# Patient Record
Sex: FEMALE | Race: WHITE | Employment: UNEMPLOYED | URBAN - METROPOLITAN AREA
[De-identification: names, ages, dates, MRNs, and addresses within clinical notes are randomized per-mention and may not be internally consistent; named-entity substitution may affect disease eponyms.]

---

## 2019-07-17 ENCOUNTER — HOSPITAL ENCOUNTER (OUTPATIENT)
Age: 24
Discharge: HOME | End: 2019-07-17
Payer: MEDICARE

## 2019-07-17 PROCEDURE — 80307 DRUG TEST PRSMV CHEM ANLYZR: CPT

## 2019-07-18 ENCOUNTER — HOSPITAL ENCOUNTER (OUTPATIENT)
Age: 24
Discharge: HOME | End: 2019-07-18
Payer: MEDICARE

## 2019-07-18 LAB
ALBUMIN SERPL-MCNC: 4.5 GM/DL (ref 3.4–5)
ALP BLD-CCNC: 122 IU/L (ref 40–128)
ALT SERPL-CCNC: 14 U/L (ref 10–40)
ANION GAP SERPL CALCULATED.3IONS-SCNC: 11 MMOL/L (ref 4–16)
AST SERPL-CCNC: 15 IU/L (ref 15–37)
BASOPHILS ABSOLUTE: 0.1 K/CU MM
BASOPHILS RELATIVE PERCENT: 0.7 % (ref 0–1)
BILIRUB SERPL-MCNC: 0.4 MG/DL (ref 0–1)
BUN BLDV-MCNC: 13 MG/DL (ref 6–23)
CALCIUM SERPL-MCNC: 9.2 MG/DL (ref 8.3–10.6)
CHLORIDE BLD-SCNC: 101 MMOL/L (ref 99–110)
CHOLESTEROL: 148 MG/DL
CO2: 26 MMOL/L (ref 21–32)
CREAT SERPL-MCNC: 0.7 MG/DL (ref 0.6–1.1)
DIFFERENTIAL TYPE: ABNORMAL
EOSINOPHILS ABSOLUTE: 0.2 K/CU MM
EOSINOPHILS RELATIVE PERCENT: 1.8 % (ref 0–3)
GFR AFRICAN AMERICAN: >60 ML/MIN/1.73M2
GFR NON-AFRICAN AMERICAN: >60 ML/MIN/1.73M2
GLUCOSE BLD-MCNC: 85 MG/DL (ref 70–99)
GONADOTROPIN, CHORIONIC (HCG) QUANT: NORMAL UIU/ML
HCT VFR BLD CALC: 42.6 % (ref 37–47)
HDLC SERPL-MCNC: 62 MG/DL
HEMOGLOBIN: 13.7 GM/DL (ref 12.5–16)
IMMATURE NEUTROPHIL %: 0.5 % (ref 0–0.43)
LDL CHOLESTEROL DIRECT: 95 MG/DL
LYMPHOCYTES ABSOLUTE: 2.8 K/CU MM
LYMPHOCYTES RELATIVE PERCENT: 25.8 % (ref 24–44)
MCH RBC QN AUTO: 28.4 PG (ref 27–31)
MCHC RBC AUTO-ENTMCNC: 32.2 % (ref 32–36)
MCV RBC AUTO: 88.4 FL (ref 78–100)
MONOCYTES ABSOLUTE: 1.2 K/CU MM
MONOCYTES RELATIVE PERCENT: 11.4 % (ref 0–4)
NUCLEATED RBC %: 0 %
PDW BLD-RTO: 13.2 % (ref 11.7–14.9)
PLATELET # BLD: 332 K/CU MM (ref 140–440)
PMV BLD AUTO: 10.5 FL (ref 7.5–11.1)
POTASSIUM SERPL-SCNC: 4.2 MMOL/L (ref 3.5–5.1)
RBC # BLD: 4.82 M/CU MM (ref 4.2–5.4)
SEGMENTED NEUTROPHILS ABSOLUTE COUNT: 6.5 K/CU MM
SEGMENTED NEUTROPHILS RELATIVE PERCENT: 59.8 % (ref 36–66)
SODIUM BLD-SCNC: 138 MMOL/L (ref 135–145)
TOTAL IMMATURE NEUTOROPHIL: 0.05 K/CU MM
TOTAL NUCLEATED RBC: 0 K/CU MM
TOTAL PROTEIN: 6.7 GM/DL (ref 6.4–8.2)
TRIGL SERPL-MCNC: 39 MG/DL
WBC # BLD: 10.8 K/CU MM (ref 4–10.5)

## 2019-07-18 PROCEDURE — 83721 ASSAY OF BLOOD LIPOPROTEIN: CPT

## 2019-07-18 PROCEDURE — 84702 CHORIONIC GONADOTROPIN TEST: CPT

## 2019-07-18 PROCEDURE — 80053 COMPREHEN METABOLIC PANEL: CPT

## 2019-07-18 PROCEDURE — 36415 COLL VENOUS BLD VENIPUNCTURE: CPT

## 2019-07-18 PROCEDURE — 85025 COMPLETE CBC W/AUTO DIFF WBC: CPT

## 2019-07-18 PROCEDURE — 80061 LIPID PANEL: CPT

## 2019-07-19 LAB
AMPHETAMINES: NEGATIVE
BARBITURATE SCREEN URINE: NEGATIVE
BENZODIAZEPINE SCREEN, URINE: NEGATIVE
CANNABINOID SCREEN URINE: NEGATIVE
COCAINE METABOLITE: NEGATIVE
OPIATES, URINE: NEGATIVE
OXYCODONE: NORMAL
PHENCYCLIDINE, URINE: NEGATIVE

## 2019-07-21 ENCOUNTER — HOSPITAL ENCOUNTER (OUTPATIENT)
Age: 24
Setting detail: SPECIMEN
Discharge: HOME | End: 2019-07-21

## 2019-07-21 PROCEDURE — 81001 URINALYSIS AUTO W/SCOPE: CPT

## 2019-07-22 LAB
BACTERIA: ABNORMAL /HPF
BILIRUBIN URINE: NEGATIVE MG/DL
BLOOD, URINE: NEGATIVE
CLARITY: CLEAR
COLOR: YELLOW
GLUCOSE, URINE: NEGATIVE MG/DL
KETONES, URINE: NEGATIVE MG/DL
LEUKOCYTE ESTERASE, URINE: NEGATIVE
NITRITE URINE, QUANTITATIVE: NEGATIVE
PH, URINE: 8 (ref 5–8)
PROTEIN UA: NEGATIVE MG/DL
RBC URINE: <1 /HPF (ref 0–6)
SPECIFIC GRAVITY UA: 1.02 (ref 1–1.03)
SQUAMOUS EPITHELIAL: 1 /HPF
TRICHOMONAS: ABNORMAL /HPF
UROBILINOGEN, URINE: NORMAL MG/DL (ref 0.2–1)
WBC UA: <1 /HPF (ref 0–5)

## 2021-11-04 ENCOUNTER — OFFICE VISIT (OUTPATIENT)
Dept: OBGYN CLINIC | Age: 26
End: 2021-11-04
Payer: COMMERCIAL

## 2021-11-04 VITALS
HEIGHT: 66 IN | DIASTOLIC BLOOD PRESSURE: 79 MMHG | BODY MASS INDEX: 42.3 KG/M2 | WEIGHT: 263.2 LBS | SYSTOLIC BLOOD PRESSURE: 114 MMHG

## 2021-11-04 DIAGNOSIS — N39.0 RECURRENT UTI: ICD-10-CM

## 2021-11-04 DIAGNOSIS — N94.6 DYSMENORRHEA: Primary | ICD-10-CM

## 2021-11-04 PROCEDURE — G8427 DOCREV CUR MEDS BY ELIG CLIN: HCPCS | Performed by: NURSE PRACTITIONER

## 2021-11-04 PROCEDURE — 1036F TOBACCO NON-USER: CPT | Performed by: NURSE PRACTITIONER

## 2021-11-04 PROCEDURE — 99203 OFFICE O/P NEW LOW 30 MIN: CPT | Performed by: NURSE PRACTITIONER

## 2021-11-04 PROCEDURE — G8417 CALC BMI ABV UP PARAM F/U: HCPCS | Performed by: NURSE PRACTITIONER

## 2021-11-04 PROCEDURE — G8484 FLU IMMUNIZE NO ADMIN: HCPCS | Performed by: NURSE PRACTITIONER

## 2021-11-04 RX ORDER — VENLAFAXINE HYDROCHLORIDE 75 MG/1
CAPSULE, EXTENDED RELEASE ORAL
COMMUNITY
Start: 2021-10-05 | End: 2021-12-08 | Stop reason: ALTCHOICE

## 2021-11-04 RX ORDER — METHOCARBAMOL 750 MG/1
TABLET ORAL
COMMUNITY
Start: 2021-10-26

## 2021-11-04 RX ORDER — ZOLPIDEM TARTRATE 5 MG/1
TABLET ORAL
COMMUNITY
Start: 2021-08-11

## 2021-11-04 RX ORDER — CARIPRAZINE 1.5 MG/1
CAPSULE, GELATIN COATED ORAL
COMMUNITY
Start: 2021-11-02

## 2021-11-04 RX ORDER — HYDROXYZINE PAMOATE 50 MG/1
CAPSULE ORAL
COMMUNITY
Start: 2021-10-05 | End: 2021-11-04 | Stop reason: SINTOL

## 2021-11-04 RX ORDER — TOPIRAMATE 50 MG/1
TABLET, FILM COATED ORAL
COMMUNITY
Start: 2021-10-05

## 2021-11-04 RX ORDER — MULTIVIT-MIN/IRON FUM/FOLIC AC 7.5 MG-4
TABLET ORAL
COMMUNITY
Start: 2021-10-26

## 2021-11-04 RX ORDER — ARIPIPRAZOLE LAUROXIL 882 MG/3.2ML
INJECTION, SUSPENSION, EXTENDED RELEASE INTRAMUSCULAR
COMMUNITY
Start: 2021-11-02 | End: 2021-12-08 | Stop reason: DRUGHIGH

## 2021-11-04 RX ORDER — ARIPIPRAZOLE 5 MG/1
TABLET ORAL
COMMUNITY
Start: 2021-08-11

## 2021-11-04 RX ORDER — NITROFURANTOIN 25; 75 MG/1; MG/1
100 CAPSULE ORAL 2 TIMES DAILY
COMMUNITY
End: 2021-12-08 | Stop reason: DRUGHIGH

## 2021-11-04 ASSESSMENT — ENCOUNTER SYMPTOMS
RESPIRATORY NEGATIVE: 1
GASTROINTESTINAL NEGATIVE: 1

## 2021-11-04 NOTE — PROGRESS NOTES
PROBLEM VISIT     Date of service: 2021    Claudia Harrell  Is a 26 y.o. female    PT's PCP is: No primary care provider on file. : 1995                                             Subjective:       No LMP recorded. Patient has had an implant. OB History    Para Term  AB Living   0 0 0 0 0 0   SAB TAB Ectopic Molar Multiple Live Births   0 0 0 0 0 0        Social History     Tobacco Use   Smoking Status Former Smoker    Types: Cigarettes   Smokeless Tobacco Never Used        Social History     Substance and Sexual Activity   Alcohol Use Yes    Comment: socially       Allergies: Buspirone, Hydroxyzine hcl, Influenza vaccines, and Lamotrigine      Current Outpatient Medications:     ascorbic acid (VITAMIN C) 1000 MG tablet, Take 1,000 mg by mouth 2 times daily, Disp: , Rfl:     nitrofurantoin, macrocrystal-monohydrate, (MACROBID) 100 MG capsule, Take 100 mg by mouth 2 times daily, Disp: , Rfl:     zolpidem (AMBIEN) 5 MG tablet, , Disp: , Rfl:     venlafaxine (EFFEXOR XR) 75 MG extended release capsule, , Disp: , Rfl:     topiramate (TOPAMAX) 50 MG tablet, , Disp: , Rfl:     ARIPiprazole (ABILIFY) 5 MG tablet, , Disp: , Rfl:     ARISTADA 882 MG/3.2ML PRSY injection, , Disp: , Rfl:     D3-50 1.25 MG (65294 UT) CAPS, , Disp: , Rfl:     etonogestrel (NEXPLANON) 68 MG implant, Inject into the skin, Disp: , Rfl:     Multiple Vitamins-Minerals (MULTIVITAMIN WITH MINERALS) tablet, , Disp: , Rfl:     VRAYLAR 1.5 MG capsule, , Disp: , Rfl:     Social History     Substance and Sexual Activity   Sexual Activity Not Currently     Chief Complaint   Patient presents with    Other     Discuss removing and replacing Nexplanon. Reports it  last month. C/O recurrent UTI's- currently being treated with Macrobid. PE:  Vital Signs  Blood pressure 114/79, height 5' 6\" (1.676 m), weight 263 lb 3.2 oz (119.4 kg).      HPI: Patient presents today to discuss Nexplanon removal and replacement. States this current  Nexplanon is 1 month . She has had two prior Nexplanon devices with good results each time. States cycles are well controlled. Complains of reoccurring UTI's. States infection typically follows use of sex toys. Currently being treatment with Macrobid     PT denies fever, chills, nausea and vomiting       Review of Systems   Constitutional: Negative. Respiratory: Negative. Cardiovascular: Negative. Gastrointestinal: Negative. Genitourinary: Positive for menstrual problem (dysmenorrhea). Negative for dysuria, frequency, pelvic pain, vaginal bleeding and vaginal discharge. Neurological: Negative. Physical Exam  Constitutional:       Appearance: Normal appearance. HENT:      Head: Normocephalic. Pulmonary:      Effort: Pulmonary effort is normal.   Musculoskeletal:         General: Normal range of motion. Neurological:      General: No focal deficit present. Mental Status: She is alert. Psychiatric:         Mood and Affect: Mood normal.         Behavior: Behavior normal.       Assessment and Plan          Diagnosis Orders   1. Dysmenorrhea  hCG, Quantitative, Pregnancy   2. Recurrent UTI       Encouraged proper cleaning of sex toys and genital hygiene. Reviewed removal and replacement procedure, risks/benefits, common bleeding patterns and side effects. Patient aware she will need hcg the day prior to insertion. Will pull Nexplanon from stock. I have discontinued Nayla Lau's hydrOXYzine. I am also having her maintain her zolpidem, venlafaxine, topiramate, ARIPiprazole, Aristada, ascorbic acid, D3-50, etonogestrel, multivitamin with minerals, Vraylar, and nitrofurantoin (macrocrystal-monohydrate). Return for Nexplanon removal and reinsertion . There are no Patient Instructions on file for this visit.     Over 50% of time spent on counseling and care coordination on: see assessment and plan,  She was also counseled on her

## 2021-11-15 ENCOUNTER — TELEPHONE (OUTPATIENT)
Dept: OBGYN CLINIC | Age: 26
End: 2021-11-15

## 2021-11-15 NOTE — TELEPHONE ENCOUNTER
Patient called stating that she was at [facility] and they changed their hours today and do not open until 9am.  She was there first thing and needs to leave town right after getting labs and cannot wait until 9am.  She is going to [facility] to get labs drawn for Nexplanon placement tomorrow. Order faxed to [phone].

## 2021-11-16 ENCOUNTER — PROCEDURE VISIT (OUTPATIENT)
Dept: OBGYN CLINIC | Age: 26
End: 2021-11-16
Payer: COMMERCIAL

## 2021-11-16 VITALS — WEIGHT: 260.6 LBS | BODY MASS INDEX: 42.06 KG/M2

## 2021-11-16 DIAGNOSIS — Z30.46 ENCOUNTER FOR REMOVAL AND REINSERTION OF NEXPLANON: Primary | ICD-10-CM

## 2021-11-16 DIAGNOSIS — N94.6 DYSMENORRHEA: ICD-10-CM

## 2021-11-16 PROCEDURE — 11983 REMOVE/INSERT DRUG IMPLANT: CPT | Performed by: NURSE PRACTITIONER

## 2021-11-16 NOTE — PROGRESS NOTES
23 y.o.  11/16/2021      Jaylin Ibarra is a 23 y.o. female is requesting to have her Nexplanon removed and replaced. She does not have any other problems today. Serum hcg was negative. Past Medical History:   Diagnosis Date    ADHD     Anxiety     Bipolar 1 disorder (HCC)     Chiari I malformation ([facility] [address]     Depression     PTSD (post-traumatic stress disorder)     Schizo affective schizophrenia ([facility]          Past Surgical History:   Procedure Laterality Date    BREAST REDUCTION SURGERY      OTHER SURGICAL HISTORY      chirari malformation decompression    WISDOM TOOTH EXTRACTION         Family History   Problem Relation Age of Onset    Heart Attack Paternal Grandfather     Stroke Paternal Grandfather     Diabetes Maternal Aunt        Social History     Tobacco Use    Smoking status: Former Smoker     Types: Cigarettes    Smokeless tobacco: Never Used   Vaping Use    Vaping Use: Some days    Substances: Nicotine, Flavoring   Substance Use Topics    Alcohol use: Yes     Comment: socially    Drug use: Never       Current Outpatient Medications on File Prior to Visit   Medication Sig Dispense Refill    zolpidem (AMBIEN) 5 MG tablet       venlafaxine (EFFEXOR XR) 75 MG extended release capsule       topiramate (TOPAMAX) 50 MG tablet       ARIPiprazole (ABILIFY) 5 MG tablet       ARISTADA 882 MG/3.2ML PRSY injection       ascorbic acid (VITAMIN C) 1000 MG tablet Take 1,000 mg by mouth 2 times daily      D3-50 1.25 MG (86094 UT) CAPS       etonogestrel (NEXPLANON) 68 MG implant Inject into the skin      Multiple Vitamins-Minerals (MULTIVITAMIN WITH MINERALS) tablet       VRAYLAR 1.5 MG capsule       nitrofurantoin, macrocrystal-monohydrate, (MACROBID) 100 MG capsule Take 100 mg by mouth 2 times daily       No current facility-administered medications on file prior to visit.        Allergies as of 11/16/2021 - Fully Reviewed 11/16/2021   Allergen Reaction Noted    Buspirone Anaphylaxis and Shortness Of Breath 2021    Hydroxyzine hcl Shortness Of Breath 2020    Influenza vaccines  2021    Lamotrigine Anaphylaxis and Rash 2021         OB History    Para Term  AB Living   0 0 0 0 0 0   SAB IAB Ectopic Molar Multiple Live Births   0 0 0 0 0 0         Weight 260 lb 9.6 oz (118.2 kg). PROCEDURE:  The patient was positioned comfortably on our procedure table. She was consented earlier in the appointment and the procedure risk and complications were reviewed. Procedure being completed on left upper arm. A sterile prep with betadine x3 swabs was completed and 1ml of lidocaine with epinephrine for local anesthetic was utilized. The skin had a small incision just beyond the proximal tip of the insert and utilizing blunt dissection the stylet was grasped and removed. Device was intact and visualized by patient and I.  A sterile dressing and steri-strip was applied with a pressure wrap. The patient tolerated the procedure well. Formal restrictions were discussed in detail. She is to notify our office if any swelling, redness, temperature, or limb restriction or numbness. No baths, Pools or Lakes until Follow up. Showers are allowed in 24 hours. She may take Tylenol for any pain. PROCEDURE:  The patient was positioned comfortably on our procedure table. She was consented earlier in the appointment and the procedure risk and complications were reviewed. She was marked. A sterile prep was completed with betadine x3 swabs and a 1% lidocaine with epinephrine for local anesthetic was utilized, 1 ml. The nexplanon carlos was noted within the applicator. The Nexplanon was inserted per protocol in the left upper arm with insertion site subdermally at the inner side of the non-dominant upper arm, overlying the triceps muscle about 8-10cm from the medial epicondyle of the humerus and 3-5cm posterior to the sulcus groove between the biceps and triceps muscle.   Placement was confirmed by palpating the device by me and the patient. A steri strip was applied. A pressure wrap was applied. The patient tolerated the procedure well. Formal restrictions were discussed in detail. She is to notify our office if any swelling, redness, temperature, or limb restriction or numbness. No baths, Pools or Lakes until Follow up. She may take Tylenol for any pain. Nexplanon [address] [id number]  Nexplanon Lot # [id number]  Nexplanon Expires 11/21/2023  Nexplanon  [facility]    Assessment and Plan          Diagnosis Orders   1. Encounter for removal and reinsertion of Nexplanon     2. Dysmenorrhea               I am having Bryce Asencio Sid maintain her zolpidem, venlafaxine, topiramate, ARIPiprazole, Aristada, ascorbic acid, D3-50, etonogestrel, multivitamin with minerals, Vraylar, and nitrofurantoin (macrocrystal-monohydrate). Return in about 1 year (around 11/16/2022) for yearly. There are no Patient Instructions on file for this visit.     GURINDER Aaron NP,11/16/2021 2:40 PM

## 2021-11-22 DIAGNOSIS — N94.6 DYSMENORRHEA: ICD-10-CM

## 2021-12-08 ENCOUNTER — OFFICE VISIT (OUTPATIENT)
Dept: OBGYN CLINIC | Age: 26
End: 2021-12-08
Payer: COMMERCIAL

## 2021-12-08 ENCOUNTER — HOSPITAL ENCOUNTER (OUTPATIENT)
Age: 26
Setting detail: SPECIMEN
Discharge: HOME | End: 2021-12-08

## 2021-12-08 VITALS — DIASTOLIC BLOOD PRESSURE: 79 MMHG | WEIGHT: 262.6 LBS | BODY MASS INDEX: 42.38 KG/M2 | SYSTOLIC BLOOD PRESSURE: 114 MMHG

## 2021-12-08 DIAGNOSIS — N76.0 ACUTE VAGINITIS: ICD-10-CM

## 2021-12-08 DIAGNOSIS — Z01.419 WOMEN'S ANNUAL ROUTINE GYNECOLOGICAL EXAMINATION: Primary | ICD-10-CM

## 2021-12-08 LAB
DIRECT EXAM: ABNORMAL
Lab: ABNORMAL
SPECIMEN DESCRIPTION: ABNORMAL

## 2021-12-08 PROCEDURE — 99395 PREV VISIT EST AGE 18-39: CPT | Performed by: NURSE PRACTITIONER

## 2021-12-08 PROCEDURE — G8484 FLU IMMUNIZE NO ADMIN: HCPCS | Performed by: NURSE PRACTITIONER

## 2021-12-08 RX ORDER — LORAZEPAM 1 MG/1
TABLET ORAL
COMMUNITY
Start: 2021-12-07

## 2021-12-08 RX ORDER — DESVENLAFAXINE 50 MG/1
TABLET, EXTENDED RELEASE ORAL
COMMUNITY
Start: 2021-12-07

## 2021-12-08 RX ORDER — MIRTAZAPINE 15 MG/1
TABLET, FILM COATED ORAL
COMMUNITY
Start: 2021-11-23

## 2021-12-08 RX ORDER — ARIPIPRAZOLE LAUROXIL 1064 MG/3.9ML
INJECTION, SUSPENSION, EXTENDED RELEASE INTRAMUSCULAR
COMMUNITY
Start: 2021-12-07

## 2021-12-08 ASSESSMENT — ENCOUNTER SYMPTOMS
SHORTNESS OF BREATH: 0
ABDOMINAL PAIN: 0
CONSTIPATION: 0
DIARRHEA: 0

## 2021-12-08 NOTE — PROGRESS NOTES
YEARLY PHYSICAL    Date of service: 2021    Nataliia Maxwell  Is a 23 y.o.  female    PT's PCP is: No primary care provider on file. : 1995                                         Chaperone for Intimate Exam   Chaperone was offered as part of the rooming process. Patient declined and agrees to continue with exam without a chaperone.  Chaperone: n/a      Subjective:       No LMP recorded. Patient has had an implant.      Are your menses regular: no menses with Nexplanon     OB History    Para Term  AB Living   0 0 0 0 0 0   SAB IAB Ectopic Molar Multiple Live Births   0 0 0 0 0 0        Social History     Tobacco Use   Smoking Status Former Smoker    Types: Cigarettes   Smokeless Tobacco Never Used        Social History     Substance and Sexual Activity   Alcohol Use Yes    Comment: socially       Family History   Problem Relation Age of Onset    Heart Attack Paternal Grandfather     Stroke Paternal Grandfather     Diabetes Maternal Aunt        Any family history of breast or ovarian cancer: No    Any family history of blood clots: No      Allergies: Buspirone, Hydroxyzine hcl, Influenza vaccines, and Lamotrigine      Current Outpatient Medications:     ARISTADA 1064 MG/3.9ML PRSY injection, , Disp: , Rfl:     mirtazapine (REMERON) 15 MG tablet, , Disp: , Rfl:     LORazepam (ATIVAN) 1 MG tablet, , Disp: , Rfl:     desvenlafaxine succinate (PRISTIQ) 50 MG TB24 extended release tablet, , Disp: , Rfl:     zolpidem (AMBIEN) 5 MG tablet, , Disp: , Rfl:     topiramate (TOPAMAX) 50 MG tablet, , Disp: , Rfl:     ARIPiprazole (ABILIFY) 5 MG tablet, , Disp: , Rfl:     ascorbic acid (VITAMIN C) 1000 MG tablet, Take 1,000 mg by mouth 2 times daily, Disp: , Rfl:     D3-50 1.25 MG (08689 UT) CAPS, , Disp: , Rfl:     etonogestrel (NEXPLANON) 68 MG implant, Inject into the skin, Disp: , Rfl:     Multiple Vitamins-Minerals (MULTIVITAMIN WITH MINERALS) tablet, , Disp: , Rfl:     VRAYLAR 1.5 MG capsule, , Disp: , Rfl:     Social History     Substance and Sexual Activity   Sexual Activity Yes    Partners: Male       Any bleeding or pain with intercourse: Not typically but once last week    Last Yearly:  ? Last pap: ? Last HPV: n/a    Do you do self breast exams: encouraged monthly SBE    Past Medical History:   Diagnosis Date    ADHD     Anxiety     Bipolar 1 disorder (HCC)     Chiari I malformation (HCC)     Depression     PTSD (post-traumatic stress disorder)     Schizo affective schizophrenia ([facility] [address]        Past Surgical History:   Procedure Laterality Date    BREAST REDUCTION SURGERY      OTHER SURGICAL HISTORY      chirari malformation decompression    WISDOM TOOTH EXTRACTION         Family History   Problem Relation Age of Onset    Heart Attack Paternal Grandfather     Stroke Paternal Grandfather     Diabetes Maternal Aunt        Chief Complaint   Patient presents with    Gynecologic Exam     Pap due.  Vaginal Itching     C/O continued itching and irritation. PE:  Vital Signs  Blood pressure 114/79, weight 262 lb 9.6 oz (119.1 kg). Estimated body mass index is 42.38 kg/m² as calculated from the following:    Height as of 11/4/21: 5' 6\" (1.676 m). Weight as of this encounter: 262 lb 9.6 oz (119.1 kg). HPI: Patient presents today for annual exam. Feeling well, voices no concerns. Denies breast/pelvic pain. Pap due. No menses with Nexplanon. Reports external vaginal itching and irritation. Denies vaginal discharge. Review of Systems   Constitutional: Negative for chills, fatigue and fever. Respiratory: Negative for shortness of breath. Cardiovascular: Negative for chest pain. Gastrointestinal: Negative for abdominal pain, constipation and diarrhea. Genitourinary: Positive for vaginal pain.  Negative for dysuria, enuresis, frequency, menstrual problem, pelvic pain, urgency, vaginal bleeding and vaginal discharge. Neurological: Negative for dizziness, light-headedness and headaches. Physical Exam  Constitutional:       General: She is not in acute distress. Appearance: Normal appearance. She is not ill-appearing. Genitourinary:      Vulva normal.      Vaginal discharge (copious amt of bright yellow discharge) and tenderness present. No vaginal bleeding. Right Adnexa: not tender. Left Adnexa: not tender. Cervical friability present. Uterus is not tender. Breasts:      Right: No mass, nipple discharge, skin change or tenderness. Left: No mass, nipple discharge, skin change or tenderness. HENT:      Head: Normocephalic. Cardiovascular:      Rate and Rhythm: Normal rate and regular rhythm. Pulmonary:      Effort: Pulmonary effort is normal.      Breath sounds: Normal breath sounds. Abdominal:      Palpations: Abdomen is soft. Tenderness: There is no abdominal tenderness. There is no guarding or rebound. Musculoskeletal:         General: Normal range of motion. Neurological:      General: No focal deficit present. Mental Status: She is alert. Psychiatric:         Mood and Affect: Mood normal.         Behavior: Behavior normal.                             Assessment and Plan          Diagnosis Orders   1. Women's annual routine gynecological examination  PAP SMEAR   2. Acute vaginitis  Chlamydia/GC DNA, Thin Prep    Vaginitis DNA Probe       Repeat Annual every 1 year  Cervical Cytology Evaluation begins at 24years old. If Negative Cytology, Follow-up screening per current guidelines. Mammograms every 1year. If 37 yo and last mammogram was negative. Routine healthmaintenance per patients PCP. reviewed copious vaginal discharge and recommendation for vaginal cultures. Patient agreeable. I have discontinued Asael Chance. Sid's venlafaxine.  I am also having her maintain her zolpidem, topiramate, ARIPiprazole, ascorbic acid, D3-50, etonogestrel, multivitamin with minerals, Vraylar, Aristada, mirtazapine, LORazepam, and desvenlafaxine succinate. Return in about 1 year (around 12/8/2022) for yearly. She was also counseled on her preventative health maintenance recommendations and follow-up. There are no Patient Instructions on file for this visit.     GURINDER Vines NP,12/8/2021 4:00 PM

## 2021-12-09 LAB
C TRACH DNA GENITAL QL NAA+PROBE: NEGATIVE
N. GONORRHOEAE DNA: NEGATIVE
SPECIMEN DESCRIPTION: NORMAL

## 2021-12-13 RX ORDER — METRONIDAZOLE 500 MG/1
500 TABLET ORAL 2 TIMES DAILY
Qty: 14 TABLET | Refills: 0 | Status: SHIPPED | OUTPATIENT
Start: 2021-12-13 | End: 2021-12-20

## 2021-12-16 LAB — CYTOLOGY REPORT: NORMAL

## 2024-03-04 ENCOUNTER — HOSPITAL ENCOUNTER (OUTPATIENT)
Age: 29
Setting detail: SPECIMEN
Discharge: HOME | End: 2024-03-04
Payer: COMMERCIAL

## 2024-03-04 LAB
CHOLEST SERPL-MCNC: 135 MG/DL
HDLC SERPL-MCNC: 39 MG/DL
LDLC SERPL CALC-MCNC: 82 MG/DL
TRIGL SERPL-MCNC: 71 MG/DL
TSH SERPL DL<=0.005 MIU/L-ACNC: 1.84 UIU/ML (ref 0.27–4.2)

## 2024-03-04 PROCEDURE — 36415 COLL VENOUS BLD VENIPUNCTURE: CPT

## 2024-03-04 PROCEDURE — 84443 ASSAY THYROID STIM HORMONE: CPT

## 2024-03-04 PROCEDURE — 80061 LIPID PANEL: CPT

## 2024-03-05 ENCOUNTER — CLINICAL DOCUMENTATION (OUTPATIENT)
Dept: INTERNAL MEDICINE CLINIC | Age: 29
End: 2024-03-05

## 2024-06-02 ENCOUNTER — APPOINTMENT (OUTPATIENT)
Dept: CT IMAGING | Age: 29
End: 2024-06-02
Payer: COMMERCIAL

## 2024-06-02 ENCOUNTER — HOSPITAL ENCOUNTER (EMERGENCY)
Age: 29
Discharge: HOME | End: 2024-06-02
Attending: EMERGENCY MEDICINE
Payer: COMMERCIAL

## 2024-06-02 VITALS
RESPIRATION RATE: 16 BRPM | WEIGHT: 270 LBS | OXYGEN SATURATION: 100 % | DIASTOLIC BLOOD PRESSURE: 74 MMHG | BODY MASS INDEX: 43.39 KG/M2 | HEIGHT: 66 IN | HEART RATE: 74 BPM | TEMPERATURE: 98.2 F | SYSTOLIC BLOOD PRESSURE: 110 MMHG

## 2024-06-02 DIAGNOSIS — S31.000D SACRAL WOUND, SUBSEQUENT ENCOUNTER: Primary | ICD-10-CM

## 2024-06-02 LAB
ANION GAP SERPL CALCULATED.3IONS-SCNC: 10 MMOL/L (ref 7–16)
BASOPHILS ABSOLUTE: 0.1 K/CU MM
BASOPHILS RELATIVE PERCENT: 0.6 % (ref 0–1)
BUN SERPL-MCNC: 10 MG/DL (ref 6–23)
CALCIUM SERPL-MCNC: 8.8 MG/DL (ref 8.3–10.6)
CHLORIDE BLD-SCNC: 103 MMOL/L (ref 99–110)
CO2: 25 MMOL/L (ref 21–32)
CREAT SERPL-MCNC: 0.6 MG/DL (ref 0.6–1.1)
DIFFERENTIAL TYPE: ABNORMAL
EOSINOPHILS ABSOLUTE: 0.3 K/CU MM
EOSINOPHILS RELATIVE PERCENT: 2.8 % (ref 0–3)
GFR, ESTIMATED: >90 ML/MIN/1.73M2
GLUCOSE SERPL-MCNC: 94 MG/DL (ref 70–99)
HCT VFR BLD CALC: 40.5 % (ref 37–47)
HEMOGLOBIN: 12.6 GM/DL (ref 12.5–16)
IMMATURE NEUTROPHIL %: 0.3 % (ref 0–0.43)
LYMPHOCYTES ABSOLUTE: 2 K/CU MM
LYMPHOCYTES RELATIVE PERCENT: 19.7 % (ref 24–44)
MCH RBC QN AUTO: 25.5 PG (ref 27–31)
MCHC RBC AUTO-ENTMCNC: 31.1 % (ref 32–36)
MCV RBC AUTO: 81.8 FL (ref 78–100)
MONOCYTES ABSOLUTE: 0.9 K/CU MM
MONOCYTES RELATIVE PERCENT: 8.6 % (ref 0–4)
NEUTROPHILS ABSOLUTE: 6.9 K/CU MM
NEUTROPHILS RELATIVE PERCENT: 68 % (ref 36–66)
NUCLEATED RBC %: 0 %
PDW BLD-RTO: 14.4 % (ref 11.7–14.9)
PLATELET # BLD: 381 K/CU MM (ref 140–440)
PMV BLD AUTO: 9.8 FL (ref 7.5–11.1)
POTASSIUM SERPL-SCNC: 4.2 MMOL/L (ref 3.5–5.1)
RBC # BLD: 4.95 M/CU MM (ref 4.2–5.4)
SODIUM BLD-SCNC: 138 MMOL/L (ref 135–145)
TOTAL IMMATURE NEUTOROPHIL: 0.03 K/CU MM
TOTAL NUCLEATED RBC: 0 K/CU MM
WBC # BLD: 10.2 K/CU MM (ref 4–10.5)

## 2024-06-02 PROCEDURE — 85025 COMPLETE CBC W/AUTO DIFF WBC: CPT

## 2024-06-02 PROCEDURE — 99284 EMERGENCY DEPT VISIT MOD MDM: CPT

## 2024-06-02 PROCEDURE — 72192 CT PELVIS W/O DYE: CPT

## 2024-06-02 PROCEDURE — 80048 BASIC METABOLIC PNL TOTAL CA: CPT

## 2024-06-02 ASSESSMENT — PAIN - FUNCTIONAL ASSESSMENT: PAIN_FUNCTIONAL_ASSESSMENT: ACTIVITIES ARE NOT PREVENTED

## 2024-06-02 ASSESSMENT — PAIN DESCRIPTION - PAIN TYPE: TYPE: CHRONIC PAIN

## 2024-06-02 ASSESSMENT — PAIN SCALES - GENERAL
PAINLEVEL_OUTOF10: 7
PAINLEVEL_OUTOF10: 7

## 2024-06-02 ASSESSMENT — PAIN DESCRIPTION - ORIENTATION: ORIENTATION: MID

## 2024-06-02 ASSESSMENT — PAIN DESCRIPTION - ONSET: ONSET: ON-GOING

## 2024-06-02 ASSESSMENT — PAIN DESCRIPTION - DESCRIPTORS
DESCRIPTORS: SHARP
DESCRIPTORS: ACHING

## 2024-06-02 ASSESSMENT — PAIN DESCRIPTION - LOCATION
LOCATION: COCCYX
LOCATION: SACRUM

## 2024-06-02 ASSESSMENT — LIFESTYLE VARIABLES
HOW OFTEN DO YOU HAVE A DRINK CONTAINING ALCOHOL: NEVER
HOW MANY STANDARD DRINKS CONTAINING ALCOHOL DO YOU HAVE ON A TYPICAL DAY: PATIENT DOES NOT DRINK

## 2024-06-02 NOTE — ED NOTES
Wound care complete at this time per this nurse. Patient sent home with extra wound supplies until she is able to get into the wound clinic. Patient discharged to home at this time. Discharge instructions and follow up care discussed, patient voices understanding.

## 2024-06-02 NOTE — ED NOTES
To and from restroom without difficulty. Pt informed that she should get a urine sample. Pt voiced understanding

## 2024-06-02 NOTE — DISCHARGE INSTRUCTIONS
Please call wound care clinic tomorrow for an appointment at the number given  Return to ER as needed

## 2024-06-02 NOTE — ED PROVIDER NOTES
Emergency Department Encounter    Patient: Jaymie Lau  MRN: [id number]  : 1995  Date of Evaluation: 2024  ED Provider:  Néstor Ybarra MD    Triage Chief Complaint:   Wound Check    [place]:  Jaymie Lau is a 28 y.o. female is morbidly obese with history of a sacral wound, rectal abscess with draining over 2 years as well as history of pilonidal disease who also has history of C. difficile diarrhea.  Patient had a injection of the rectal abscesses at [facility] on  this year and was referred to [facility].  Patient states she is supposed to be recovering in a local nursing home but because of insurance complication (not covering nursing home stay) she is now staying with family and friends and today presents emergency room stating that she feels the sacral wound is draining more material and she is concerned about the sacral wound being more deep and developing deeper tissue abscess.  She does not admit to fever or chills.  She denies bleeding or difficulty with defecation.  She denies dysuria.     ROS - see HPI, below listed is current ROS at time of my eval:  General:  No fevers, no chills, no weakness  Eyes:  No recent vison changes, no discharge  ENT:  No sore throat, no nasal congestion, no hearing changes  Cardiovascular:  No chest pain, no palpitations  Respiratory:  No shortness of breath, no cough, no wheezing  Gastrointestinal:  No pain, no nausea, no vomiting, no diarrhea  Musculoskeletal:  No muscle pain, no joint pain  Skin:  No rash, no pruritis, no easy bruising  Neurologic:  No speech problems, no headache, no extremity numbness, no extremity tingling, no extremity weakness  Psychiatric:  No anxiety  Genitourinary:  No dysuria, no hematuria.  Chronic sacral wound draining more material  Endocrine:  No unexpected weight gain, no unexpected weight loss  Extremities:  no edema, no pain    Past Medical History:   Diagnosis Date    ADHD     Anxiety     Bipolar 1 disorder (HCC)

## 2024-06-03 ENCOUNTER — HOSPITAL ENCOUNTER (EMERGENCY)
Age: 29
Discharge: HOME | End: 2024-06-04
Attending: STUDENT IN AN ORGANIZED HEALTH CARE EDUCATION/TRAINING PROGRAM
Payer: COMMERCIAL

## 2024-06-03 DIAGNOSIS — S31.000A WOUND OF SACRAL REGION, INITIAL ENCOUNTER: Primary | ICD-10-CM

## 2024-06-03 LAB
ALBUMIN SERPL-MCNC: 4.2 GM/DL (ref 3.4–5)
ALP BLD-CCNC: 132 IU/L (ref 40–129)
ALT SERPL-CCNC: 22 U/L (ref 10–40)
ANION GAP SERPL CALCULATED.3IONS-SCNC: 15 MMOL/L (ref 7–16)
AST SERPL-CCNC: 23 IU/L (ref 15–37)
BASOPHILS ABSOLUTE: 0.1 K/CU MM
BASOPHILS RELATIVE PERCENT: 0.5 % (ref 0–1)
BILIRUB SERPL-MCNC: 0.1 MG/DL (ref 0–1)
BUN SERPL-MCNC: 16 MG/DL (ref 6–23)
CALCIUM SERPL-MCNC: 9 MG/DL (ref 8.3–10.6)
CHLORIDE BLD-SCNC: 105 MMOL/L (ref 99–110)
CO2: 22 MMOL/L (ref 21–32)
CREAT SERPL-MCNC: 0.7 MG/DL (ref 0.6–1.1)
DIFFERENTIAL TYPE: ABNORMAL
EOSINOPHILS ABSOLUTE: 0.2 K/CU MM
EOSINOPHILS RELATIVE PERCENT: 1.4 % (ref 0–3)
GFR, ESTIMATED: >90 ML/MIN/1.73M2
GLUCOSE SERPL-MCNC: 98 MG/DL (ref 70–99)
HCT VFR BLD CALC: 37.9 % (ref 37–47)
HEMOGLOBIN: 12.2 GM/DL (ref 12.5–16)
IMMATURE NEUTROPHIL %: 0.4 % (ref 0–0.43)
LYMPHOCYTES ABSOLUTE: 3 K/CU MM
LYMPHOCYTES RELATIVE PERCENT: 20.7 % (ref 24–44)
MCH RBC QN AUTO: 25.7 PG (ref 27–31)
MCHC RBC AUTO-ENTMCNC: 32.2 % (ref 32–36)
MCV RBC AUTO: 80 FL (ref 78–100)
MONOCYTES ABSOLUTE: 1.1 K/CU MM
MONOCYTES RELATIVE PERCENT: 7.9 % (ref 0–4)
NEUTROPHILS ABSOLUTE: 9.9 K/CU MM
NEUTROPHILS RELATIVE PERCENT: 69.1 % (ref 36–66)
NUCLEATED RBC %: 0 %
PDW BLD-RTO: 14.4 % (ref 11.7–14.9)
PLATELET # BLD: 429 K/CU MM (ref 140–440)
PMV BLD AUTO: 10.1 FL (ref 7.5–11.1)
POTASSIUM SERPL-SCNC: 4.3 MMOL/L (ref 3.5–5.1)
RBC # BLD: 4.74 M/CU MM (ref 4.2–5.4)
SODIUM BLD-SCNC: 142 MMOL/L (ref 135–145)
TOTAL IMMATURE NEUTOROPHIL: 0.05 K/CU MM
TOTAL NUCLEATED RBC: 0 K/CU MM
TOTAL PROTEIN: 7.4 GM/DL (ref 6.4–8.2)
WBC # BLD: 14.3 K/CU MM (ref 4–10.5)

## 2024-06-03 PROCEDURE — 85025 COMPLETE CBC W/AUTO DIFF WBC: CPT

## 2024-06-03 PROCEDURE — 80053 COMPREHEN METABOLIC PANEL: CPT

## 2024-06-03 PROCEDURE — 99283 EMERGENCY DEPT VISIT LOW MDM: CPT

## 2024-06-03 ASSESSMENT — LIFESTYLE VARIABLES
HOW MANY STANDARD DRINKS CONTAINING ALCOHOL DO YOU HAVE ON A TYPICAL DAY: PATIENT DOES NOT DRINK
HOW OFTEN DO YOU HAVE A DRINK CONTAINING ALCOHOL: NEVER

## 2024-06-03 ASSESSMENT — PAIN SCALES - GENERAL
PAINLEVEL_OUTOF10: 7
PAINLEVEL_OUTOF10: 7

## 2024-06-03 ASSESSMENT — PAIN DESCRIPTION - ORIENTATION: ORIENTATION: INNER

## 2024-06-03 ASSESSMENT — PAIN - FUNCTIONAL ASSESSMENT: PAIN_FUNCTIONAL_ASSESSMENT: 0-10

## 2024-06-03 ASSESSMENT — PAIN DESCRIPTION - LOCATION
LOCATION: SACRUM
LOCATION: STERNUM

## 2024-06-04 ENCOUNTER — HOSPITAL ENCOUNTER (OUTPATIENT)
Dept: WOUND CARE | Age: 29
Discharge: HOME | End: 2024-06-04
Attending: NURSE PRACTITIONER
Payer: COMMERCIAL

## 2024-06-04 VITALS
DIASTOLIC BLOOD PRESSURE: 66 MMHG | HEART RATE: 103 BPM | TEMPERATURE: 97.7 F | RESPIRATION RATE: 16 BRPM | SYSTOLIC BLOOD PRESSURE: 96 MMHG

## 2024-06-04 VITALS
SYSTOLIC BLOOD PRESSURE: 114 MMHG | RESPIRATION RATE: 17 BRPM | HEIGHT: 65 IN | DIASTOLIC BLOOD PRESSURE: 75 MMHG | TEMPERATURE: 98.9 F | HEART RATE: 80 BPM | BODY MASS INDEX: 44.98 KG/M2 | WEIGHT: 270 LBS | OXYGEN SATURATION: 99 %

## 2024-06-04 DIAGNOSIS — L05.91 CHRONIC RECURRENT PILONIDAL CYST: Primary | ICD-10-CM

## 2024-06-04 DIAGNOSIS — T81.89XA NON-HEALING SURGICAL WOUND, INITIAL ENCOUNTER: ICD-10-CM

## 2024-06-04 PROCEDURE — 6370000000 HC RX 637 (ALT 250 FOR IP): Performed by: STUDENT IN AN ORGANIZED HEALTH CARE EDUCATION/TRAINING PROGRAM

## 2024-06-04 PROCEDURE — 11042 DBRDMT SUBQ TIS 1ST 20SQCM/<: CPT

## 2024-06-04 PROCEDURE — 99203 OFFICE O/P NEW LOW 30 MIN: CPT

## 2024-06-04 PROCEDURE — 99203 OFFICE O/P NEW LOW 30 MIN: CPT | Performed by: NURSE PRACTITIONER

## 2024-06-04 PROCEDURE — 11042 DBRDMT SUBQ TIS 1ST 20SQCM/<: CPT | Performed by: NURSE PRACTITIONER

## 2024-06-04 RX ORDER — IBUPROFEN 200 MG
TABLET ORAL ONCE
OUTPATIENT
Start: 2024-06-04 | End: 2024-06-04

## 2024-06-04 RX ORDER — ARIPIPRAZOLE 400 MG
400 KIT INTRAMUSCULAR
COMMUNITY
Start: 2024-06-21

## 2024-06-04 RX ORDER — TRIAMCINOLONE ACETONIDE 1 MG/G
OINTMENT TOPICAL ONCE
OUTPATIENT
Start: 2024-06-04 | End: 2024-06-04

## 2024-06-04 RX ORDER — SULFAMETHOXAZOLE AND TRIMETHOPRIM 800; 160 MG/1; MG/1
1 TABLET ORAL 2 TIMES DAILY
Qty: 14 TABLET | Refills: 0 | Status: SHIPPED | OUTPATIENT
Start: 2024-06-04 | End: 2024-06-11

## 2024-06-04 RX ORDER — HYDROXYZINE 50 MG/1
50 TABLET, FILM COATED ORAL EVERY 8 HOURS PRN
COMMUNITY
Start: 2024-04-25

## 2024-06-04 RX ORDER — CLOBETASOL PROPIONATE 0.5 MG/G
OINTMENT TOPICAL ONCE
OUTPATIENT
Start: 2024-06-04 | End: 2024-06-04

## 2024-06-04 RX ORDER — SULFAMETHOXAZOLE AND TRIMETHOPRIM 800; 160 MG/1; MG/1
1 TABLET ORAL ONCE
Status: COMPLETED | OUTPATIENT
Start: 2024-06-04 | End: 2024-06-04

## 2024-06-04 RX ORDER — LIDOCAINE HYDROCHLORIDE 20 MG/ML
JELLY TOPICAL ONCE
OUTPATIENT
Start: 2024-06-04 | End: 2024-06-04

## 2024-06-04 RX ORDER — BACITRACIN ZINC AND POLYMYXIN B SULFATE 500; 1000 [USP'U]/G; [USP'U]/G
OINTMENT TOPICAL ONCE
OUTPATIENT
Start: 2024-06-04 | End: 2024-06-04

## 2024-06-04 RX ORDER — GENTAMICIN SULFATE 1 MG/G
OINTMENT TOPICAL ONCE
OUTPATIENT
Start: 2024-06-04 | End: 2024-06-04

## 2024-06-04 RX ORDER — GINSENG 100 MG
CAPSULE ORAL ONCE
OUTPATIENT
Start: 2024-06-04 | End: 2024-06-04

## 2024-06-04 RX ORDER — BETAMETHASONE DIPROPIONATE 0.5 MG/G
CREAM TOPICAL ONCE
OUTPATIENT
Start: 2024-06-04 | End: 2024-06-04

## 2024-06-04 RX ORDER — SODIUM CHLOR/HYPOCHLOROUS ACID 0.033 %
SOLUTION, IRRIGATION IRRIGATION ONCE
OUTPATIENT
Start: 2024-06-04 | End: 2024-06-04

## 2024-06-04 RX ORDER — LIDOCAINE HYDROCHLORIDE 40 MG/ML
SOLUTION TOPICAL ONCE
OUTPATIENT
Start: 2024-06-04 | End: 2024-06-04

## 2024-06-04 RX ORDER — LIDOCAINE 50 MG/G
OINTMENT TOPICAL ONCE
OUTPATIENT
Start: 2024-06-04 | End: 2024-06-04

## 2024-06-04 RX ORDER — LIDOCAINE 40 MG/G
CREAM TOPICAL ONCE
OUTPATIENT
Start: 2024-06-04 | End: 2024-06-04

## 2024-06-04 RX ADMIN — SULFAMETHOXAZOLE AND TRIMETHOPRIM 1 TABLET: 800; 160 TABLET ORAL at 02:03

## 2024-06-04 ASSESSMENT — PAIN DESCRIPTION - DESCRIPTORS: DESCRIPTORS: BURNING;THROBBING;STABBING

## 2024-06-04 ASSESSMENT — ENCOUNTER SYMPTOMS
NAUSEA: 0
SHORTNESS OF BREATH: 0
COUGH: 0
VOMITING: 0
ABDOMINAL PAIN: 0

## 2024-06-04 ASSESSMENT — PAIN SCALES - GENERAL
PAINLEVEL_OUTOF10: 6
PAINLEVEL_OUTOF10: 7

## 2024-06-04 ASSESSMENT — PAIN DESCRIPTION - FREQUENCY: FREQUENCY: CONTINUOUS

## 2024-06-04 ASSESSMENT — PAIN DESCRIPTION - LOCATION
LOCATION: COCCYX
LOCATION: COCCYX

## 2024-06-04 NOTE — PATIENT INSTRUCTIONS
PHYSICIAN ORDERS AND DISCHARGE INSTRUCTIONS  NOTE: Upon discharge from the Wound Center, you will receive a patient experience survey via E-mail. We would be grateful if you would take the time to fill this survey out.  Wound care order history:   TONA's   Right       Left    Date    Vascular studies/Intervention: .     Cultures: .               Antibiotics: .               HbA1c:  .               Grafts:  .   Juxta Lites & Lymph Pumps:  Continuing wound care orders and information:              Residence: .              Continue home health care with:.    Your wound-care supplies will be provided by: .              Pharmacy: .  Wound cleansing:      Do not scrub or use excessive force.    Wash hands with soap and water before and after dressing changes.    Prior to applying a clean dressing, cleanse wound with normal saline,    wound cleanser, or mild soap and water.     Ask your physician or nurse before getting the wound(s) wet in the shower.  Daily Wound management:    Keep weight off wounds and reposition every 2 hours.    Avoid standing for long periods of time.    Evaluate legs to the level of the heart or above for 30 minutes 4-5 times a day and/or when sitting.       When taking antibiotics take entire prescription as ordered by MD do not stop taking until medicine is all gone.     Documentation:  Compression: .   Offloading: .        Orders for this week (6/4/2024):  Coccyx - Wash with mild soap and water, rinse with saline, pat dry with 4x4  Apply silvadene cream and stimulen to wound, fill with 2x2 gauze  Cover with optifoam border  Change daily and as needed    Supplies ordered thru [facility] 6/4/24   Please dispense 30 day quantity when sending supplies     Follow up with Jhonatan ROMERO  In 1 weeks in the wound care center  Call [phone] for any questions or concerns.

## 2024-06-04 NOTE — DISCHARGE INSTRUCTIONS
You are seen here today for possible infected wound.  I do have concerns of possible infection.  Please take your antibiotics as prescribed until they are complete.  Follow-up with wound care this morning.  I also recommend you call and schedule follow-up appointment with a general surgeon for soon as possible.  Return the emergency department for any fevers, worsening discharge or any other new worsening concerning complaints.

## 2024-06-04 NOTE — PROGRESS NOTES
Wound Care Center Initial Visit      Jaymie Lau  AGE: 28 y.o.   GENDER: female  : 1995  EPISODE DATE:  2024   Referred by:post-op team    Subjective:     CHIEF COMPLAINT nonhealing wound from pilonidal cyst removal     HISTORY of PRESENT ILLNESS      Jaymie Lau is a 28 y.o. female who presents to the Wound Clinic for an initial visit for evaluation and treatment of Chronic non-healing surgical  ulcer(s) of  Coccyx.  The condition is of moderate severity. The ulcer has been present for several years on and off.  The underlying cause is thought to be recurrent pilonidal cyst.  The patients care to date has included multiple surgeries and most recently wet to dry dressings. The patient has significant underlying medical conditions as below.     Patient picking up antibiotics today and will encourage her to finish these    Patient is not a diabetic or on blood thinners.   She is a current, everyday smoker    Patient counseled in length on smoking cessation including benefits of quitting and health risks of continuing to smoke including but not limited to lung cancer, COPD, risk of coronary artery disease. Patient verbalized understanding today, not ready to quit.     Wound Pain Timing/Severity: waxing and waning  Quality of pain: tender  Severity of pain:  2 / 10   Modifying Factors: chronic pressure and poor hygiene  Associated Signs/Symptoms: edema, erythema, drainage, and pain        PAST MEDICAL HISTORY        Diagnosis Date    ADHD     Anxiety     Bipolar 1 disorder (HCC)     Chiari I malformation (HCC)     Depression     PTSD (post-traumatic stress disorder)     Schizo affective schizophrenia (HCC)        PAST SURGICAL HISTORY    Past Surgical History:   Procedure Laterality Date    BREAST REDUCTION SURGERY      OTHER SURGICAL HISTORY      chirari malformation decompression    WISDOM TOOTH EXTRACTION         FAMILY HISTORY    Family History   Problem Relation Age of Onset    Heart Attack

## 2024-06-04 NOTE — ED PROVIDER NOTES
ordered.    Risk  Prescription drug management.           CONSULTS:  None    CRITICAL CARE:  Total critical care time today provided was at least 0 minutes hat required close evaluation and/or intervention with concern for patient decompensation, discussion with patient and family about goals of care in the setting of a critical scenario, discussion with consulting services. This excludes seperately billable procedures and teaching.     FINAL IMPRESSION      1. Wound of sacral region, initial encounter          DISPOSITION / PLAN     DISPOSITION Decision To Discharge 06/04/2024 01:56:08 AM      PATIENT REFERRED TO:  Eleanor Pradhan MD  [address]  [place] [address]  [phone]            DISCHARGE MEDICATIONS:  New Prescriptions    SULFAMETHOXAZOLE-TRIMETHOPRIM (BACTRIM DS;SEPTRA DS) 800-160 MG PER TABLET    Take 1 tablet by mouth 2 times daily for 7 days       Ernesto Aguilar DO  Emergency Medicine    (Please note that portions of this note were completed with a voice recognition program.  Efforts were made to edit the dictations but occasionally words are mis-transcribed.)        Ernesto Aguilar DO  06/04/24 0204     regular

## 2024-06-05 ENCOUNTER — TELEPHONE (OUTPATIENT)
Dept: WOUND CARE | Age: 29
End: 2024-06-05

## 2024-06-05 NOTE — TELEPHONE ENCOUNTER
Anika with [facility] called to verify wound care orders. Patient left [facility] 06/04/24 and drove to [place]-she is currently stat[facility] at a homes shelter. I do not know if patient will return to [facility].

## 2024-06-11 ENCOUNTER — TELEPHONE (OUTPATIENT)
Dept: WOUND CARE | Age: 29
End: 2024-06-11

## 2024-06-11 NOTE — TELEPHONE ENCOUNTER
Returning patient call reguarding messagel left last night. Patient left message to have recordes sent someplace. She did not say where she wanted the records sent. Called patient, no answer, left message. Patient was no show for appointment today.

## 2024-06-21 ENCOUNTER — TELEPHONE (OUTPATIENT)
Dept: WOUND CARE | Age: 29
End: 2024-06-21

## 2024-06-27 NOTE — TELEPHONE ENCOUNTER
Pt has decided to f/u with another provider for their wounds due to moving. No need for further phone calls.    Electronically signed by Tamy Paige RN on 6/27/2024 at 11:06 AM

## 2024-07-23 ENCOUNTER — HOSPITAL ENCOUNTER (EMERGENCY)
Age: 29
Discharge: HOME | End: 2024-07-23
Attending: EMERGENCY MEDICINE
Payer: COMMERCIAL

## 2024-07-23 VITALS
DIASTOLIC BLOOD PRESSURE: 56 MMHG | RESPIRATION RATE: 20 BRPM | OXYGEN SATURATION: 99 % | TEMPERATURE: 97.4 F | SYSTOLIC BLOOD PRESSURE: 110 MMHG | HEART RATE: 80 BPM

## 2024-07-23 DIAGNOSIS — T81.49XA POST-OPERATIVE WOUND ABSCESS: ICD-10-CM

## 2024-07-23 DIAGNOSIS — A59.9 TRICHOMONIASIS: ICD-10-CM

## 2024-07-23 DIAGNOSIS — N39.0 URINARY TRACT INFECTION WITHOUT HEMATURIA, SITE UNSPECIFIED: Primary | ICD-10-CM

## 2024-07-23 DIAGNOSIS — S31.000A SACRAL WOUND, INITIAL ENCOUNTER: ICD-10-CM

## 2024-07-23 LAB
ALBUMIN SERPL-MCNC: 4.1 G/DL (ref 3.5–5.2)
ALP SERPL-CCNC: 137 U/L (ref 35–104)
ALT SERPL-CCNC: 17 U/L (ref 0–32)
ANION GAP SERPL CALCULATED.3IONS-SCNC: 15 MMOL/L (ref 7–16)
AST SERPL-CCNC: 18 U/L (ref 0–31)
BACTERIA URNS QL MICRO: ABNORMAL
BASOPHILS # BLD: 0.06 K/UL (ref 0–0.2)
BASOPHILS NFR BLD: 1 % (ref 0–2)
BILIRUB DIRECT SERPL-MCNC: <0.2 MG/DL (ref 0–0.3)
BILIRUB INDIRECT SERPL-MCNC: ABNORMAL MG/DL (ref 0–1)
BILIRUB SERPL-MCNC: 0.3 MG/DL (ref 0–1.2)
BILIRUB UR QL STRIP: ABNORMAL
BUN SERPL-MCNC: 14 MG/DL (ref 6–20)
CALCIUM SERPL-MCNC: 9.1 MG/DL (ref 8.6–10.2)
CHLORIDE SERPL-SCNC: 100 MMOL/L (ref 98–107)
CLARITY UR: ABNORMAL
CO2 SERPL-SCNC: 21 MMOL/L (ref 22–29)
COLOR UR: YELLOW
CREAT SERPL-MCNC: 0.7 MG/DL (ref 0.5–1)
EOSINOPHIL # BLD: 0.08 K/UL (ref 0.05–0.5)
EOSINOPHILS RELATIVE PERCENT: 1 % (ref 0–6)
EPI CELLS #/AREA URNS HPF: ABNORMAL /HPF
ERYTHROCYTE [DISTWIDTH] IN BLOOD BY AUTOMATED COUNT: 14.3 % (ref 11.5–15)
GFR, ESTIMATED: >90 ML/MIN/1.73M2
GLUCOSE SERPL-MCNC: 105 MG/DL (ref 74–99)
GLUCOSE UR STRIP-MCNC: NEGATIVE MG/DL
HCT VFR BLD AUTO: 40 % (ref 34–48)
HGB BLD-MCNC: 12.7 G/DL (ref 11.5–15.5)
HGB UR QL STRIP.AUTO: ABNORMAL
IMM GRANULOCYTES # BLD AUTO: 0.1 K/UL (ref 0–0.58)
IMM GRANULOCYTES NFR BLD: 1 % (ref 0–5)
KETONES UR STRIP-MCNC: NEGATIVE MG/DL
LACTATE BLDV-SCNC: 1.4 MMOL/L (ref 0.5–2.2)
LEUKOCYTE ESTERASE UR QL STRIP: ABNORMAL
LIPASE SERPL-CCNC: 15 U/L (ref 13–60)
LYMPHOCYTES NFR BLD: 2.04 K/UL (ref 1.5–4)
LYMPHOCYTES RELATIVE PERCENT: 18 % (ref 20–42)
MCH RBC QN AUTO: 24.3 PG (ref 26–35)
MCHC RBC AUTO-ENTMCNC: 31.8 G/DL (ref 32–34.5)
MCV RBC AUTO: 76.5 FL (ref 80–99.9)
MONOCYTES NFR BLD: 1.03 K/UL (ref 0.1–0.95)
MONOCYTES NFR BLD: 9 % (ref 2–12)
NEUTROPHILS NFR BLD: 72 % (ref 43–80)
NEUTS SEG NFR BLD: 8.32 K/UL (ref 1.8–7.3)
NITRITE UR QL STRIP: NEGATIVE
PH UR STRIP: 6 [PH] (ref 5–9)
PLATELET # BLD AUTO: 389 K/UL (ref 130–450)
PMV BLD AUTO: 10.1 FL (ref 7–12)
POTASSIUM SERPL-SCNC: 3.7 MMOL/L (ref 3.5–5)
PROT SERPL-MCNC: 7.5 G/DL (ref 6.4–8.3)
PROT UR STRIP-MCNC: 30 MG/DL
RBC # BLD AUTO: 5.23 M/UL (ref 3.5–5.5)
RBC #/AREA URNS HPF: ABNORMAL /HPF
SODIUM SERPL-SCNC: 136 MMOL/L (ref 132–146)
SP GR UR STRIP: 1.02 (ref 1–1.03)
TRICHOMONAS #/AREA URNS HPF: PRESENT /[HPF]
UROBILINOGEN UR STRIP-ACNC: 0.2 EU/DL (ref 0–1)
WBC #/AREA URNS HPF: ABNORMAL /HPF
WBC OTHER # BLD: 11.6 K/UL (ref 4.5–11.5)

## 2024-07-23 PROCEDURE — 2580000003 HC RX 258: Performed by: EMERGENCY MEDICINE

## 2024-07-23 PROCEDURE — 87086 URINE CULTURE/COLONY COUNT: CPT

## 2024-07-23 PROCEDURE — 87449 NOS EACH ORGANISM AG IA: CPT

## 2024-07-23 PROCEDURE — 96361 HYDRATE IV INFUSION ADD-ON: CPT

## 2024-07-23 PROCEDURE — 83690 ASSAY OF LIPASE: CPT

## 2024-07-23 PROCEDURE — 81001 URINALYSIS AUTO W/SCOPE: CPT

## 2024-07-23 PROCEDURE — 96375 TX/PRO/DX INJ NEW DRUG ADDON: CPT

## 2024-07-23 PROCEDURE — 99284 EMERGENCY DEPT VISIT MOD MDM: CPT

## 2024-07-23 PROCEDURE — 83605 ASSAY OF LACTIC ACID: CPT

## 2024-07-23 PROCEDURE — 87324 CLOSTRIDIUM AG IA: CPT

## 2024-07-23 PROCEDURE — 96374 THER/PROPH/DIAG INJ IV PUSH: CPT

## 2024-07-23 PROCEDURE — 6360000002 HC RX W HCPCS: Performed by: EMERGENCY MEDICINE

## 2024-07-23 PROCEDURE — 6370000000 HC RX 637 (ALT 250 FOR IP): Performed by: EMERGENCY MEDICINE

## 2024-07-23 PROCEDURE — 82248 BILIRUBIN DIRECT: CPT

## 2024-07-23 PROCEDURE — 80053 COMPREHEN METABOLIC PANEL: CPT

## 2024-07-23 PROCEDURE — 85025 COMPLETE CBC W/AUTO DIFF WBC: CPT

## 2024-07-23 RX ORDER — 0.9 % SODIUM CHLORIDE 0.9 %
1000 INTRAVENOUS SOLUTION INTRAVENOUS ONCE
Status: COMPLETED | OUTPATIENT
Start: 2024-07-23 | End: 2024-07-23

## 2024-07-23 RX ORDER — ONDANSETRON 2 MG/ML
4 INJECTION INTRAMUSCULAR; INTRAVENOUS ONCE
Status: COMPLETED | OUTPATIENT
Start: 2024-07-23 | End: 2024-07-23

## 2024-07-23 RX ORDER — METRONIDAZOLE 500 MG/1
500 TABLET ORAL ONCE
Status: COMPLETED | OUTPATIENT
Start: 2024-07-23 | End: 2024-07-23

## 2024-07-23 RX ORDER — GRANULES FOR ORAL 3 G/1
3 POWDER ORAL ONCE
Status: COMPLETED | OUTPATIENT
Start: 2024-07-23 | End: 2024-07-23

## 2024-07-23 RX ORDER — PROCHLORPERAZINE EDISYLATE 5 MG/ML
10 INJECTION INTRAMUSCULAR; INTRAVENOUS ONCE
Status: COMPLETED | OUTPATIENT
Start: 2024-07-23 | End: 2024-07-23

## 2024-07-23 RX ADMIN — SODIUM CHLORIDE 1000 ML: 9 INJECTION, SOLUTION INTRAVENOUS at 15:08

## 2024-07-23 RX ADMIN — SODIUM CHLORIDE 1000 ML: 9 INJECTION, SOLUTION INTRAVENOUS at 17:21

## 2024-07-23 RX ADMIN — GRANULES FOR ORAL SOLUTION 1 PACKET: 3 POWDER ORAL at 17:50

## 2024-07-23 RX ADMIN — ONDANSETRON 4 MG: 2 INJECTION INTRAMUSCULAR; INTRAVENOUS at 15:09

## 2024-07-23 RX ADMIN — PROCHLORPERAZINE EDISYLATE 10 MG: 5 INJECTION INTRAMUSCULAR; INTRAVENOUS at 17:49

## 2024-07-23 RX ADMIN — METRONIDAZOLE 500 MG: 500 TABLET ORAL at 17:21

## 2024-07-23 NOTE — ED PROVIDER NOTES
[facility] EMERGENCY DEPARTMENT  EMERGENCY DEPARTMENT ENCOUNTER        Pt Name: Jaymie Lau  MRN: [id number]  Birthdate 1995  Date of evaluation: 7/23/2024  Provider: Lola Ruelas DO  PCP: No primary care provider on file.  Note Started: 6:01 PM EDT 7/23/24    CHIEF COMPLAINT       Chief Complaint   Patient presents with    Emesis     N/v for 2 days    Diarrhea     Since Saturday, has c-diff    Wound Check     Had a wound vac on coccyx wound but did not have staff or supplies to care for wound and wants it checked.        HISTORY OF PRESENT ILLNESS: 1 or more Elements        Limitations to history : None    Jaymie Lau is a 28 y.o. female who presents for evaluation of nausea, vomiting and diarrhea for the past 2 days.  She reports that her stools are intermittently soft and liquid.  She is currently on Dificid for C. difficile.  Denies abdominal pain.  Denies fever or chills.  She does have a wound to her sacrum secondary to a pilonidal cyst removal and was previously on a wound VAC.  She does have follow-up with wound care on the first.    Nursing Notes were all reviewed and agreed with or any disagreements were addressed in the HPI.      REVIEW OF EXTERNAL NOTE :       Reviewed previous hospital encounter on 7/8/2024 for auditory hallucinations      Chart Review/External Note Review    Last Echo reviewed by Me:  No results found for: \"LVEF\", \"LVEFMODE\"          Controlled Substance Monitoring:    Acute and Chronic Pain Monitoring:        No data to display                    REVIEW OF SYSTEMS :      Positives and Pertinent negatives as per HPI.     SURGICAL HISTORY     Past Surgical History:   Procedure Laterality Date    BREAST REDUCTION SURGERY      OTHER SURGICAL HISTORY      chirari malformation decompression    WISDOM TOOTH EXTRACTION         CURRENTMEDICATIONS       Discharge Medication List as of 7/23/2024  6:12 PM        CONTINUE these medications which

## 2024-07-25 LAB
MICROORGANISM SPEC CULT: ABNORMAL
SERVICE CMNT-IMP: ABNORMAL
SPECIMEN DESCRIPTION: ABNORMAL

## 2024-07-28 ENCOUNTER — HOSPITAL ENCOUNTER (EMERGENCY)
Age: 29
Discharge: PSYCHIATRIC OR SUBSTANCE USE FACILITY | End: 2024-07-29
Attending: EMERGENCY MEDICINE
Payer: COMMERCIAL

## 2024-07-28 DIAGNOSIS — R45.851 SUICIDAL IDEATION: Primary | ICD-10-CM

## 2024-07-28 LAB
ALBUMIN SERPL-MCNC: 3.7 G/DL (ref 3.5–5.2)
ALP SERPL-CCNC: 133 U/L (ref 35–104)
ALT SERPL-CCNC: 13 U/L (ref 0–32)
AMPHET UR QL SCN: NEGATIVE
ANION GAP SERPL CALCULATED.3IONS-SCNC: 12 MMOL/L (ref 7–16)
AST SERPL-CCNC: 12 U/L (ref 0–31)
BACTERIA URNS QL MICRO: ABNORMAL
BARBITURATES UR QL SCN: NEGATIVE
BASOPHILS # BLD: 0.04 K/UL (ref 0–0.2)
BASOPHILS NFR BLD: 0 % (ref 0–2)
BENZODIAZ UR QL: NEGATIVE
BILIRUB SERPL-MCNC: 0.2 MG/DL (ref 0–1.2)
BILIRUB UR QL STRIP: NEGATIVE
BUN SERPL-MCNC: 9 MG/DL (ref 6–20)
BUPRENORPHINE UR QL: NEGATIVE
CALCIUM SERPL-MCNC: 8.7 MG/DL (ref 8.6–10.2)
CANNABINOIDS UR QL SCN: POSITIVE
CHLORIDE SERPL-SCNC: 105 MMOL/L (ref 98–107)
CLARITY UR: ABNORMAL
CO2 SERPL-SCNC: 23 MMOL/L (ref 22–29)
COCAINE UR QL SCN: NEGATIVE
COLOR UR: YELLOW
CREAT SERPL-MCNC: 0.7 MG/DL (ref 0.5–1)
EOSINOPHIL # BLD: 0.18 K/UL (ref 0.05–0.5)
EOSINOPHILS RELATIVE PERCENT: 1 % (ref 0–6)
EPI CELLS #/AREA URNS HPF: ABNORMAL /HPF
ERYTHROCYTE [DISTWIDTH] IN BLOOD BY AUTOMATED COUNT: 14.3 % (ref 11.5–15)
FENTANYL UR QL: NEGATIVE
GFR, ESTIMATED: >90 ML/MIN/1.73M2
GLUCOSE SERPL-MCNC: 113 MG/DL (ref 74–99)
GLUCOSE UR STRIP-MCNC: NEGATIVE MG/DL
HCT VFR BLD AUTO: 38 % (ref 34–48)
HGB BLD-MCNC: 12.2 G/DL (ref 11.5–15.5)
HGB UR QL STRIP.AUTO: ABNORMAL
IMM GRANULOCYTES # BLD AUTO: 0.04 K/UL (ref 0–0.58)
IMM GRANULOCYTES NFR BLD: 0 % (ref 0–5)
KETONES UR STRIP-MCNC: ABNORMAL MG/DL
LEUKOCYTE ESTERASE UR QL STRIP: ABNORMAL
LYMPHOCYTES NFR BLD: 2.35 K/UL (ref 1.5–4)
LYMPHOCYTES RELATIVE PERCENT: 18 % (ref 20–42)
MCH RBC QN AUTO: 24.7 PG (ref 26–35)
MCHC RBC AUTO-ENTMCNC: 32.1 G/DL (ref 32–34.5)
MCV RBC AUTO: 76.9 FL (ref 80–99.9)
METHADONE UR QL: NEGATIVE
MONOCYTES NFR BLD: 1.14 K/UL (ref 0.1–0.95)
MONOCYTES NFR BLD: 9 % (ref 2–12)
NEUTROPHILS NFR BLD: 71 % (ref 43–80)
NEUTS SEG NFR BLD: 9.27 K/UL (ref 1.8–7.3)
NITRITE UR QL STRIP: NEGATIVE
OPIATES UR QL SCN: NEGATIVE
OXYCODONE UR QL SCN: NEGATIVE
PCP UR QL SCN: NEGATIVE
PH UR STRIP: 6.5 [PH] (ref 5–9)
PLATELET # BLD AUTO: 367 K/UL (ref 130–450)
PMV BLD AUTO: 10 FL (ref 7–12)
POTASSIUM SERPL-SCNC: 3.6 MMOL/L (ref 3.5–5)
PROT SERPL-MCNC: 6.8 G/DL (ref 6.4–8.3)
PROT UR STRIP-MCNC: ABNORMAL MG/DL
RBC # BLD AUTO: 4.94 M/UL (ref 3.5–5.5)
RBC #/AREA URNS HPF: ABNORMAL /HPF
SODIUM SERPL-SCNC: 140 MMOL/L (ref 132–146)
SP GR UR STRIP: >1.03 (ref 1–1.03)
TEST INFORMATION: ABNORMAL
UROBILINOGEN UR STRIP-ACNC: 0.2 EU/DL (ref 0–1)
WBC #/AREA URNS HPF: ABNORMAL /HPF
WBC OTHER # BLD: 13 K/UL (ref 4.5–11.5)

## 2024-07-28 PROCEDURE — 80179 DRUG ASSAY SALICYLATE: CPT

## 2024-07-28 PROCEDURE — 85025 COMPLETE CBC W/AUTO DIFF WBC: CPT

## 2024-07-28 PROCEDURE — 93005 ELECTROCARDIOGRAM TRACING: CPT

## 2024-07-28 PROCEDURE — 99285 EMERGENCY DEPT VISIT HI MDM: CPT

## 2024-07-28 PROCEDURE — G0480 DRUG TEST DEF 1-7 CLASSES: HCPCS

## 2024-07-28 PROCEDURE — 80307 DRUG TEST PRSMV CHEM ANLYZR: CPT

## 2024-07-28 PROCEDURE — 80053 COMPREHEN METABOLIC PANEL: CPT

## 2024-07-28 PROCEDURE — 81001 URINALYSIS AUTO W/SCOPE: CPT

## 2024-07-28 PROCEDURE — 80143 DRUG ASSAY ACETAMINOPHEN: CPT

## 2024-07-28 PROCEDURE — 84703 CHORIONIC GONADOTROPIN ASSAY: CPT

## 2024-07-28 ASSESSMENT — PAIN - FUNCTIONAL ASSESSMENT: PAIN_FUNCTIONAL_ASSESSMENT: 0-10

## 2024-07-28 ASSESSMENT — PAIN SCALES - GENERAL: PAINLEVEL_OUTOF10: 6

## 2024-07-28 NOTE — ED NOTES
Patient reports still actively having suicidal ideations, denies homicidal ideations. Patient stated that her plan was to crash her vehicle.

## 2024-07-28 NOTE — ED PROVIDER NOTES
Department of Emergency Medicine     Written by: Dg Lord DO  Patient Name: Jaymie Lau  Admit Date: 2024  6:00 PM  MRN: [id number]                   : 1995      ------------------------- CC-------------------------  Chief Complaint   Patient presents with    Suicidal     Pt reports for the last week that she has suicidal thoughts with plan to wreck her car or cutting her throat. Has not been taking care of herself. States she has been taking meds as prescribed. Pt feels hopeless and did not know what else to do. Denies any thoughts of wanting to hurt others. Pt reports hx of schizo/affective disorder & states she sees and hear things. Pt reports hearing voices at this time. They tell her to \"kill herself\"     ------------------------- HPI -------------------------    Jaymie Lau is a 28 y.o. female who presents to the emergency department today complaining of suicidal ideation.  Patient states over the last week, she has had recurrent suicidal thoughts.  She is hearing voices telling her to wreck her car when she is driving or slice her throat.  She has been taking her meds as prescribed though she feels her medications are making this worse.  She denies any homicidal ideation.  States she has history of schizoaffective disorder and that she does see and hear things from time to time.  She states currently in the emergency department she is hearing voices.  She states she smokes marijuana but uses no other drugs.  She states he was recently told she had an STD although she is not sure which 1 and also is concerned she has urinary tract infection.  Patient denies any lightheadedness or dizziness, fever or chills, nausea or vomiting, chest pain, shortness of breath, abdominal pain, hematuria or dysuria, constipation or diarrhea.    Nursing notes were all reviewed and agreed with or any disagreements were addressed in the HPI.    REVIEW OF SYSTEMS:    Pertinent positives and negatives

## 2024-07-28 NOTE — ED NOTES
Patients belongings were gathered and placed in a patient belonging bag (clothes, wallet, cell phone).  Bag was placed in locker # 2.

## 2024-07-29 VITALS
HEART RATE: 76 BPM | HEIGHT: 65 IN | BODY MASS INDEX: 43.49 KG/M2 | OXYGEN SATURATION: 100 % | RESPIRATION RATE: 16 BRPM | DIASTOLIC BLOOD PRESSURE: 66 MMHG | TEMPERATURE: 98 F | SYSTOLIC BLOOD PRESSURE: 103 MMHG | WEIGHT: 261 LBS

## 2024-07-29 LAB
APAP SERPL-MCNC: <5 UG/ML (ref 10–30)
ETHANOLAMINE SERPL-MCNC: <10 MG/DL (ref 0–0.08)
HCG UR QL: NEGATIVE
SALICYLATES SERPL-MCNC: <0.3 MG/DL (ref 0–30)
TOXIC TRICYCLIC SC,BLOOD: NEGATIVE

## 2024-07-29 ASSESSMENT — PAIN - FUNCTIONAL ASSESSMENT: PAIN_FUNCTIONAL_ASSESSMENT: NONE - DENIES PAIN

## 2024-07-29 NOTE — ED NOTES
Verified removal of ligature risks from room, patient actively in paper gown, belongings removed from room. Patient educated on suicide precautions and constant observation

## 2024-07-29 NOTE — ED NOTES
Pt. Report given to [facility], patient transferred off the floor at this time.

## 2024-07-29 NOTE — ED NOTES
Bedside Nurse to Nurse given to Glenny ARAGON, no further questions at this time. Patient updated on plan of care. Sitter at bedside.

## 2024-07-29 NOTE — ED NOTES
Patient requested that I assessed her wound. Wound WDL and MD aware. Patient returned to bed, sitter at bedside

## 2024-07-29 NOTE — ED NOTES
[facility] - Telehealth consent signed by patient and received. Behavioral Health Crisis Assessment completed via telehealth Ipad.     Behavioral Health Crisis Assessment      Chief Complaint: Patient said SI has increased in the last week, became severe today.    Mental Status Exam: Patient alert/oriented X3. Patient with flat affect, withdrawn. Patient calm and cooperative with no sign of agitation. Patient speech clear, thought process circumferential. Patient reported hearing commands to kill herself, also sees \"red\" and \"disfigured faces.\" Patient reporting continuous SI with plans, denied having HI.     Legal Status:  [] Voluntary:  [x] Involuntary, Issued by: ED doctor    Gender:  [] Male [x] Female [] Transgender  [] Other    Sexual Orientation:  [x] Heterosexual [] Homosexual [] Bisexual [] Other    Brief Clinical Summary:    Patient is a 28 year old female who presented to the ED as a walk-in. Patient is on an involuntary hold: Patient states that she is having suicidal thoughts and hearing voices telling her to kill herself. She had a thought a couple of days ago telling her to drive into oncoming traffic and also one telling her to cut her throat. She states no matter what she does the thoughts don't go away. SW met with patient for telehealth assessment. Patient said she was kicked out of her housing a week ago and has been staying with a roommate since. Patient acknowledging having continuous SI for the past week. Patient said the SI was not due to having a recent break up, patient said \"I feel like I'm not worth nothing, a waste of space. I don't want to bother anybody.\" Patient noted having a history of self-injurious behavior, said she used to bang her head and also cut herself. Patient said she hasn't cut \"since age 20.\" Patient noted having a history of hanging attempt \"when I was a teenager.\" Patient said her father  by suicide by hanging when she was age 11. Patient denied having HI, denied

## 2024-07-29 NOTE — ED NOTES
CALL  PLACED TO [facility] TO NOTIFY OF MEDICAL CLEARANCE. CURRENT SW STATES PT WILL BE SCREENED BY MIDNIGHT SW ON CALL

## 2024-07-29 NOTE — ED NOTES
Spoke to [facility] regarding placement. Phone # for report is [phone] and [facility] ETA [phone].

## 2024-07-29 NOTE — ED NOTES
Spoke to patient regarding [facility], patient in bed with sitter at bedside

## 2024-07-29 NOTE — ED NOTES
SUNIL contacted [facility] ([facility]) to inform that assessment has been completed, noted SW didn't see a pregnancy test in labs-to make sure this is completed before referring to [facility] (no current beds at [facility]).

## 2024-07-29 NOTE — ED NOTES
Shift change, CO at door, has no new complaints nor concerns at this time, patient in bed, has 100% breakfast, no other needs nor concerns at this time, bed in low position, lights dimmed, CO at door.

## 2024-07-29 NOTE — ED NOTES
Patient requested that we contact her mother and let her know where she is going. Attempted to call twice and was sent straight to voicemail.

## 2024-07-30 LAB
EKG ATRIAL RATE: 81 BPM
EKG P AXIS: 55 DEGREES
EKG P-R INTERVAL: 162 MS
EKG Q-T INTERVAL: 372 MS
EKG QRS DURATION: 78 MS
EKG QTC CALCULATION (BAZETT): 432 MS
EKG R AXIS: 50 DEGREES
EKG T AXIS: 20 DEGREES
EKG VENTRICULAR RATE: 81 BPM

## 2024-07-30 PROCEDURE — 93010 ELECTROCARDIOGRAM REPORT: CPT | Performed by: INTERNAL MEDICINE

## 2024-08-22 ENCOUNTER — HOSPITAL ENCOUNTER (INPATIENT)
Age: 29
LOS: 6 days | Discharge: NURSING, REHAB OR LONG-TERM CARE | End: 2024-08-29
Attending: EMERGENCY MEDICINE | Admitting: FAMILY MEDICINE
Payer: COMMERCIAL

## 2024-08-22 DIAGNOSIS — T14.8XXA INFECTED OPEN WOUND: ICD-10-CM

## 2024-08-22 DIAGNOSIS — L08.9 INFECTED OPEN WOUND: ICD-10-CM

## 2024-08-22 DIAGNOSIS — L08.9 WOUND INFECTION: Primary | ICD-10-CM

## 2024-08-22 DIAGNOSIS — T14.8XXA WOUND INFECTION: Primary | ICD-10-CM

## 2024-08-22 DIAGNOSIS — L05.91 CHRONIC RECURRENT PILONIDAL CYST: ICD-10-CM

## 2024-08-22 LAB
HCG, URINE, POC: NEGATIVE
Lab: NORMAL
NEGATIVE QC PASS/FAIL: NORMAL
POSITIVE QC PASS/FAIL: NORMAL

## 2024-08-22 PROCEDURE — 87077 CULTURE AEROBIC IDENTIFY: CPT

## 2024-08-22 PROCEDURE — 87205 SMEAR GRAM STAIN: CPT

## 2024-08-22 PROCEDURE — 6360000002 HC RX W HCPCS

## 2024-08-22 PROCEDURE — 96361 HYDRATE IV INFUSION ADD-ON: CPT

## 2024-08-22 PROCEDURE — 99285 EMERGENCY DEPT VISIT HI MDM: CPT

## 2024-08-22 PROCEDURE — 96375 TX/PRO/DX INJ NEW DRUG ADDON: CPT

## 2024-08-22 PROCEDURE — 80053 COMPREHEN METABOLIC PANEL: CPT

## 2024-08-22 PROCEDURE — 87075 CULTR BACTERIA EXCEPT BLOOD: CPT

## 2024-08-22 PROCEDURE — 87070 CULTURE OTHR SPECIMN AEROBIC: CPT

## 2024-08-22 PROCEDURE — 83690 ASSAY OF LIPASE: CPT

## 2024-08-22 PROCEDURE — 87040 BLOOD CULTURE FOR BACTERIA: CPT

## 2024-08-22 PROCEDURE — 87185 SC STD ENZYME DETCJ PER NZM: CPT

## 2024-08-22 PROCEDURE — 86403 PARTICLE AGGLUT ANTBDY SCRN: CPT

## 2024-08-22 PROCEDURE — 85025 COMPLETE CBC W/AUTO DIFF WBC: CPT

## 2024-08-22 PROCEDURE — 83605 ASSAY OF LACTIC ACID: CPT

## 2024-08-22 PROCEDURE — 2580000003 HC RX 258

## 2024-08-22 RX ORDER — 0.9 % SODIUM CHLORIDE 0.9 %
1000 INTRAVENOUS SOLUTION INTRAVENOUS ONCE
Status: COMPLETED | OUTPATIENT
Start: 2024-08-22 | End: 2024-08-23

## 2024-08-22 RX ORDER — FENTANYL CITRATE 50 UG/ML
50 INJECTION, SOLUTION INTRAMUSCULAR; INTRAVENOUS ONCE
Status: COMPLETED | OUTPATIENT
Start: 2024-08-22 | End: 2024-08-22

## 2024-08-22 RX ADMIN — FENTANYL CITRATE 50 MCG: 50 INJECTION INTRAMUSCULAR; INTRAVENOUS at 23:27

## 2024-08-22 RX ADMIN — SODIUM CHLORIDE 1000 ML: 9 INJECTION, SOLUTION INTRAVENOUS at 23:26

## 2024-08-22 ASSESSMENT — LIFESTYLE VARIABLES: HOW OFTEN DO YOU HAVE A DRINK CONTAINING ALCOHOL: NEVER

## 2024-08-23 ENCOUNTER — APPOINTMENT (OUTPATIENT)
Dept: CT IMAGING | Age: 29
End: 2024-08-23
Payer: COMMERCIAL

## 2024-08-23 PROBLEM — T14.8XXA WOUND INFECTION: Status: ACTIVE | Noted: 2024-08-23

## 2024-08-23 PROBLEM — F20.89 OTHER SCHIZOPHRENIA (HCC): Status: ACTIVE | Noted: 2024-08-23

## 2024-08-23 PROBLEM — T14.8XXA INFECTED OPEN WOUND: Status: ACTIVE | Noted: 2024-08-23

## 2024-08-23 PROBLEM — S31.000A WOUND OF SACRAL REGION: Status: ACTIVE | Noted: 2024-08-23

## 2024-08-23 PROBLEM — L08.9 INFECTED OPEN WOUND: Status: ACTIVE | Noted: 2024-08-23

## 2024-08-23 PROBLEM — L08.9 WOUND INFECTION: Status: ACTIVE | Noted: 2024-08-23

## 2024-08-23 LAB
ALBUMIN SERPL-MCNC: 4 G/DL (ref 3.5–5.2)
ALP SERPL-CCNC: 143 U/L (ref 35–104)
ALT SERPL-CCNC: 14 U/L (ref 0–32)
ANION GAP SERPL CALCULATED.3IONS-SCNC: 14 MMOL/L (ref 7–16)
AST SERPL-CCNC: 13 U/L (ref 0–31)
BASOPHILS # BLD: 0.04 K/UL (ref 0–0.2)
BASOPHILS NFR BLD: 0 % (ref 0–2)
BILIRUB SERPL-MCNC: 0.4 MG/DL (ref 0–1.2)
BUN SERPL-MCNC: 12 MG/DL (ref 6–20)
CALCIUM SERPL-MCNC: 8.8 MG/DL (ref 8.6–10.2)
CHLORIDE SERPL-SCNC: 97 MMOL/L (ref 98–107)
CO2 SERPL-SCNC: 25 MMOL/L (ref 22–29)
CREAT SERPL-MCNC: 0.8 MG/DL (ref 0.5–1)
EOSINOPHIL # BLD: 0.15 K/UL (ref 0.05–0.5)
EOSINOPHILS RELATIVE PERCENT: 1 % (ref 0–6)
ERYTHROCYTE [DISTWIDTH] IN BLOOD BY AUTOMATED COUNT: 15.2 % (ref 11.5–15)
GFR, ESTIMATED: >90 ML/MIN/1.73M2
GLUCOSE SERPL-MCNC: 81 MG/DL (ref 74–99)
HCT VFR BLD AUTO: 40.9 % (ref 34–48)
HGB BLD-MCNC: 12.6 G/DL (ref 11.5–15.5)
IMM GRANULOCYTES # BLD AUTO: 0.09 K/UL (ref 0–0.58)
IMM GRANULOCYTES NFR BLD: 1 % (ref 0–5)
LACTATE BLDV-SCNC: 1.6 MMOL/L (ref 0.5–1.9)
LIPASE SERPL-CCNC: 14 U/L (ref 13–60)
LYMPHOCYTES NFR BLD: 3.08 K/UL (ref 1.5–4)
LYMPHOCYTES RELATIVE PERCENT: 19 % (ref 20–42)
MCH RBC QN AUTO: 23.6 PG (ref 26–35)
MCHC RBC AUTO-ENTMCNC: 30.8 G/DL (ref 32–34.5)
MCV RBC AUTO: 76.7 FL (ref 80–99.9)
MONOCYTES NFR BLD: 1.15 K/UL (ref 0.1–0.95)
MONOCYTES NFR BLD: 7 % (ref 2–12)
NEUTROPHILS NFR BLD: 72 % (ref 43–80)
NEUTS SEG NFR BLD: 11.81 K/UL (ref 1.8–7.3)
PLATELET # BLD AUTO: 427 K/UL (ref 130–450)
PMV BLD AUTO: 9.8 FL (ref 7–12)
POTASSIUM SERPL-SCNC: 3.6 MMOL/L (ref 3.5–5)
PROT SERPL-MCNC: 7.2 G/DL (ref 6.4–8.3)
RBC # BLD AUTO: 5.33 M/UL (ref 3.5–5.5)
SODIUM SERPL-SCNC: 136 MMOL/L (ref 132–146)
WBC OTHER # BLD: 16.3 K/UL (ref 4.5–11.5)

## 2024-08-23 PROCEDURE — 6360000002 HC RX W HCPCS: Performed by: SPECIALIST

## 2024-08-23 PROCEDURE — 99221 1ST HOSP IP/OBS SF/LOW 40: CPT | Performed by: SURGERY

## 2024-08-23 PROCEDURE — 6370000000 HC RX 637 (ALT 250 FOR IP): Performed by: SPECIALIST

## 2024-08-23 PROCEDURE — 2580000003 HC RX 258: Performed by: SPECIALIST

## 2024-08-23 PROCEDURE — 6360000002 HC RX W HCPCS: Performed by: NURSE PRACTITIONER

## 2024-08-23 PROCEDURE — 96361 HYDRATE IV INFUSION ADD-ON: CPT

## 2024-08-23 PROCEDURE — 6370000000 HC RX 637 (ALT 250 FOR IP): Performed by: FAMILY MEDICINE

## 2024-08-23 PROCEDURE — 2580000003 HC RX 258

## 2024-08-23 PROCEDURE — 74177 CT ABD & PELVIS W/CONTRAST: CPT

## 2024-08-23 PROCEDURE — APPSS45 APP SPLIT SHARED TIME 31-45 MINUTES: Performed by: NURSE PRACTITIONER

## 2024-08-23 PROCEDURE — 99222 1ST HOSP IP/OBS MODERATE 55: CPT | Performed by: FAMILY MEDICINE

## 2024-08-23 PROCEDURE — 6360000004 HC RX CONTRAST MEDICATION: Performed by: RADIOLOGY

## 2024-08-23 PROCEDURE — 2580000003 HC RX 258: Performed by: NURSE PRACTITIONER

## 2024-08-23 PROCEDURE — 1200000000 HC SEMI PRIVATE

## 2024-08-23 PROCEDURE — 96365 THER/PROPH/DIAG IV INF INIT: CPT

## 2024-08-23 PROCEDURE — 6360000002 HC RX W HCPCS

## 2024-08-23 PROCEDURE — 96367 TX/PROPH/DG ADDL SEQ IV INF: CPT

## 2024-08-23 PROCEDURE — 6360000002 HC RX W HCPCS: Performed by: FAMILY MEDICINE

## 2024-08-23 RX ORDER — IOPAMIDOL 755 MG/ML
80 INJECTION, SOLUTION INTRAVASCULAR
Status: COMPLETED | OUTPATIENT
Start: 2024-08-23 | End: 2024-08-23

## 2024-08-23 RX ORDER — KETOROLAC TROMETHAMINE 30 MG/ML
30 INJECTION, SOLUTION INTRAMUSCULAR; INTRAVENOUS ONCE
Status: COMPLETED | OUTPATIENT
Start: 2024-08-23 | End: 2024-08-23

## 2024-08-23 RX ORDER — ENOXAPARIN SODIUM 100 MG/ML
30 INJECTION SUBCUTANEOUS 2 TIMES DAILY
Status: DISCONTINUED | OUTPATIENT
Start: 2024-08-23 | End: 2024-08-29 | Stop reason: HOSPADM

## 2024-08-23 RX ORDER — ACETAMINOPHEN 325 MG/1
650 TABLET ORAL EVERY 6 HOURS PRN
Status: DISCONTINUED | OUTPATIENT
Start: 2024-08-23 | End: 2024-08-29 | Stop reason: HOSPADM

## 2024-08-23 RX ORDER — MAGNESIUM SULFATE IN WATER 40 MG/ML
2000 INJECTION, SOLUTION INTRAVENOUS PRN
Status: DISCONTINUED | OUTPATIENT
Start: 2024-08-23 | End: 2024-08-29 | Stop reason: HOSPADM

## 2024-08-23 RX ORDER — LACTOBACILLUS RHAMNOSUS GG 10B CELL
1 CAPSULE ORAL 2 TIMES DAILY WITH MEALS
Status: DISCONTINUED | OUTPATIENT
Start: 2024-08-23 | End: 2024-08-29 | Stop reason: HOSPADM

## 2024-08-23 RX ORDER — POLYETHYLENE GLYCOL 3350 17 G/17G
17 POWDER, FOR SOLUTION ORAL DAILY PRN
Status: DISCONTINUED | OUTPATIENT
Start: 2024-08-23 | End: 2024-08-29 | Stop reason: HOSPADM

## 2024-08-23 RX ORDER — SODIUM CHLORIDE 0.9 % (FLUSH) 0.9 %
5-40 SYRINGE (ML) INJECTION EVERY 12 HOURS SCHEDULED
Status: DISCONTINUED | OUTPATIENT
Start: 2024-08-23 | End: 2024-08-29 | Stop reason: HOSPADM

## 2024-08-23 RX ORDER — SODIUM CHLORIDE 9 MG/ML
INJECTION, SOLUTION INTRAVENOUS PRN
Status: DISCONTINUED | OUTPATIENT
Start: 2024-08-23 | End: 2024-08-29 | Stop reason: HOSPADM

## 2024-08-23 RX ORDER — ONDANSETRON 2 MG/ML
4 INJECTION INTRAMUSCULAR; INTRAVENOUS EVERY 6 HOURS PRN
Status: DISCONTINUED | OUTPATIENT
Start: 2024-08-23 | End: 2024-08-29 | Stop reason: HOSPADM

## 2024-08-23 RX ORDER — LUMATEPERONE 21 MG/1
21 CAPSULE ORAL NIGHTLY
COMMUNITY

## 2024-08-23 RX ORDER — OXYCODONE AND ACETAMINOPHEN 10; 325 MG/1; MG/1
1 TABLET ORAL EVERY 8 HOURS PRN
Status: DISCONTINUED | OUTPATIENT
Start: 2024-08-23 | End: 2024-08-24

## 2024-08-23 RX ORDER — PRAZOSIN HYDROCHLORIDE 1 MG/1
1-2 CAPSULE ORAL NIGHTLY PRN
COMMUNITY

## 2024-08-23 RX ORDER — ACETAMINOPHEN 650 MG/1
650 SUPPOSITORY RECTAL EVERY 6 HOURS PRN
Status: DISCONTINUED | OUTPATIENT
Start: 2024-08-23 | End: 2024-08-29 | Stop reason: HOSPADM

## 2024-08-23 RX ORDER — POTASSIUM CHLORIDE 7.45 MG/ML
10 INJECTION INTRAVENOUS PRN
Status: DISCONTINUED | OUTPATIENT
Start: 2024-08-23 | End: 2024-08-28

## 2024-08-23 RX ORDER — POTASSIUM CHLORIDE 1500 MG/1
40 TABLET, EXTENDED RELEASE ORAL PRN
Status: DISCONTINUED | OUTPATIENT
Start: 2024-08-23 | End: 2024-08-28

## 2024-08-23 RX ORDER — SODIUM CHLORIDE 0.9 % (FLUSH) 0.9 %
5-40 SYRINGE (ML) INJECTION PRN
Status: DISCONTINUED | OUTPATIENT
Start: 2024-08-23 | End: 2024-08-29 | Stop reason: HOSPADM

## 2024-08-23 RX ORDER — ONDANSETRON 4 MG/1
4 TABLET, ORALLY DISINTEGRATING ORAL EVERY 8 HOURS PRN
Status: DISCONTINUED | OUTPATIENT
Start: 2024-08-23 | End: 2024-08-29 | Stop reason: HOSPADM

## 2024-08-23 RX ADMIN — IOPAMIDOL 80 ML: 755 INJECTION, SOLUTION INTRAVENOUS at 03:59

## 2024-08-23 RX ADMIN — KETOROLAC TROMETHAMINE 30 MG: 30 INJECTION, SOLUTION INTRAMUSCULAR at 16:24

## 2024-08-23 RX ADMIN — Medication 10 ML: at 21:11

## 2024-08-23 RX ADMIN — ENOXAPARIN SODIUM 30 MG: 100 INJECTION SUBCUTANEOUS at 09:30

## 2024-08-23 RX ADMIN — Medication 125 MG: at 12:45

## 2024-08-23 RX ADMIN — ENOXAPARIN SODIUM 30 MG: 100 INJECTION SUBCUTANEOUS at 21:02

## 2024-08-23 RX ADMIN — OXYCODONE AND ACETAMINOPHEN 1 TABLET: 325; 10 TABLET ORAL at 22:47

## 2024-08-23 RX ADMIN — VANCOMYCIN HYDROCHLORIDE 2500 MG: 10 INJECTION, POWDER, LYOPHILIZED, FOR SOLUTION INTRAVENOUS at 05:05

## 2024-08-23 RX ADMIN — VANCOMYCIN HYDROCHLORIDE 1500 MG: 10 INJECTION, POWDER, LYOPHILIZED, FOR SOLUTION INTRAVENOUS at 21:07

## 2024-08-23 RX ADMIN — Medication 10 ML: at 09:30

## 2024-08-23 RX ADMIN — MEROPENEM 1000 MG: 1 INJECTION INTRAVENOUS at 01:21

## 2024-08-23 RX ADMIN — Medication 125 MG: at 21:07

## 2024-08-23 RX ADMIN — Medication 1 CAPSULE: at 21:02

## 2024-08-23 ASSESSMENT — PAIN DESCRIPTION - DESCRIPTORS
DESCRIPTORS: DISCOMFORT;THROBBING
DESCRIPTORS: THROBBING;DISCOMFORT;ACHING
DESCRIPTORS: DISCOMFORT;THROBBING
DESCRIPTORS: DISCOMFORT;THROBBING

## 2024-08-23 ASSESSMENT — LIFESTYLE VARIABLES: HOW OFTEN DO YOU HAVE A DRINK CONTAINING ALCOHOL: MONTHLY OR LESS

## 2024-08-23 ASSESSMENT — PAIN - FUNCTIONAL ASSESSMENT
PAIN_FUNCTIONAL_ASSESSMENT: ACTIVITIES ARE NOT PREVENTED
PAIN_FUNCTIONAL_ASSESSMENT: ACTIVITIES ARE NOT PREVENTED

## 2024-08-23 ASSESSMENT — PAIN DESCRIPTION - ONSET: ONSET: ON-GOING

## 2024-08-23 ASSESSMENT — PAIN DESCRIPTION - ORIENTATION
ORIENTATION: MID

## 2024-08-23 ASSESSMENT — PAIN SCALES - GENERAL
PAINLEVEL_OUTOF10: 6
PAINLEVEL_OUTOF10: 7
PAINLEVEL_OUTOF10: 6
PAINLEVEL_OUTOF10: 7

## 2024-08-23 ASSESSMENT — PAIN DESCRIPTION - LOCATION
LOCATION: COCCYX

## 2024-08-23 ASSESSMENT — PAIN DESCRIPTION - FREQUENCY: FREQUENCY: CONTINUOUS

## 2024-08-23 ASSESSMENT — PAIN DESCRIPTION - PAIN TYPE: TYPE: ACUTE PAIN

## 2024-08-23 NOTE — H&P
[facility] Hospitalist Group   History and Physical      CHIEF COMPLAINT: Wound Check    History of Present Illness:  28 y.o. female with a history of ADHD, Anxiety, Bipolar 1 Disorder, Depression, and Schizophrenia presents with Wound check. Pt states has a history of pilonidal cyst that she had an operation on in March. She has noted increased drainage and worsening pain. Drainage has significantly worsened yesterday. She has also developed N/V/D over the last couple of days. States She has had difficulty with wound affecting her overall mental health. States recently she noted malodorous drainage and flies near wound. States she has a history of blood clots 2/2 PICC line. She is not on anticoagulation. She was recently treated with Dificid for Cdiff. States she has completed treatment. She would like area to be closed. Denies fevers, CP, SOB. Pt received Fentanyl 50mcg IV, Meropenem 1000mg IV, 1L NSS bolus and Vancomycin 2500mg IV in ED course. Decision to admit pt for further evaluation and treatment.     Informant(s) for H&P:Pt and EMR    REVIEW OF SYSTEMS:  Admits to Chills, N/V/D, Coccyx wound. Denies  fevers,  cp, sob, n/v, ha, vision/hearing changes, wt changes, hot/cold flashes, other open skin lesions, diarrhea, constipation, dysuria/hematuria unless noted in HPI. Complete ROS performed with the patient and is otherwise negative.      PMH:  Past Medical History:   Diagnosis Date    ADHD     Anxiety     Bipolar 1 disorder (HCC)     Chiari I malformation (HCC)     Depression     PTSD (post-traumatic stress disorder)     Schizo affective schizophrenia (HCC)        Surgical History:  Past Surgical History:   Procedure Laterality Date    BREAST REDUCTION SURGERY      OTHER SURGICAL HISTORY      chirari malformation decompression    WISDOM TOOTH EXTRACTION         Medications Prior to Admission:    Prior to Admission medications    Not on File       Allergies:    Buspirone, Influenza  vaccines, Lamotrigine, Keflex [cephalexin], and Penicillins    Social History:    reports that she has been smoking cigarettes. She uses smokeless tobacco. She reports current alcohol use. She reports current drug use. Drug: Marijuana (Weed).      Family History:   family history includes Diabetes in her maternal aunt; Heart Attack in her paternal grandfather; Stroke in her paternal grandfather. Reviewed    PHYSICAL EXAM:  Vitals:  /74   Pulse 71   Temp 98.3 °F (36.8 °C)   Resp 13   Wt 122.9 kg (271 lb)   SpO2 96%   BMI 45.10 kg/m²     General Appearance: alert and oriented to person, place and time and in no acute distress  Skin: warm and dry  Head: normocephalic and atraumatic  Eyes: pupils equal, round, and reactive to light, extraocular eye movements intact, conjunctivae normal  Neck: neck supple and non tender without mass   Pulmonary/Chest: clear to auscultation bilaterally- no wheezes, rales or rhonchi, normal air movement, no respiratory distress  Cardiovascular: normal rate, normal S1 and S2 and no RGM  Abdomen: soft, non-tender, non-distended, normal bowel sounds  Neurologic: no cranial nerve deficit and speech normal    LABS:  Recent Labs     08/22/24  2257      K 3.6   CL 97*   CO2 25   BUN 12   CREATININE 0.8   GLUCOSE 81   CALCIUM 8.8       Recent Labs     08/22/24  2257   WBC 16.3*   RBC 5.33   HGB 12.6   HCT 40.9   MCV 76.7*   MCH 23.6*   MCHC 30.8*   RDW 15.2*      MPV 9.8       No results for input(s): \"POCGLU\" in the last 72 hours.        Radiology: CT ABDOMEN PELVIS W IV CONTRAST Additional Contrast? None    Result Date: 8/23/2024  EXAMINATION: CT OF THE ABDOMEN AND PELVIS WITH CONTRAST 8/23/2024 4:00 am TECHNIQUE: CT of the abdomen and pelvis was performed with the administration of intravenous contrast. Multiplanar reformatted images are provided for review. Automated exposure control, iterative reconstruction, and/or weight based adjustment of the mA/kV was utilized to

## 2024-08-23 NOTE — ED PROVIDER NOTES
abdomen and pelvis revealing no acute abnormalities, no apparent fluid collection.  Therefore, plan to admit.  Patient was given fentanyl for pain, 1 L IV normal saline in ED and 1 g of meropenem in ED. Spoke with IM who agreed to admit.     Amount and/or Complexity of Data Reviewed  Labs: ordered.  Radiology: ordered and independent interpretation performed.    Risk  Prescription drug management.  Decision regarding hospitalization.        History From: Patient    Social Determinants of Health : None    Chronic Conditions affecting care: Jaymie is a 28-year-old female who   has a past medical history of ADHD, Anxiety, Bipolar 1 disorder (HCC), Chiari I malformation ([facility]), Depression, PTSD (post-traumatic stress disorder), and Schizo affective schizophrenia ([facility]).     Records Reviewed (Source) ED note from [facility] reviewed-GIAN Lambert 7/31/2024    CONSULTS: (Who and What was discussed)  IP CONSULT TO GENERAL SURGERY  PHARMACY TO DOSE VANCOMYCIN  IP CONSULT TO INFECTIOUS DISEASES     See ED COURSE    Disposition Considerations (Tests not ordered but considered, Shared Decision Making, Pt Expectation of Test or Tx.): Spoke with patient regarding plans for testing and treatment.  She is understanding and agreeable to plan.    Diagnoses considered include (but not limited to): Wound infection versus sepsis versus abscess versus nec fasc, etc.    See ED COURSE for additional MDM. Labs & imaging reviewed and interpreted, see RESULTS above.     Re-Evaluations:           See ED Course above.       This patient's ED course included: a personal history and physicial examination, multiple bedside re-evaluations, IV medications, cardiac monitoring, and continuous pulse oximetry    This patient has remained hemodynamically stable during their ED course.      Consultations:  See ED Course    Counseling:   The emergency physician has spoken with the patient and discussed today’s results, in addition to providing specific  details for the plan of care and counseling regarding the diagnosis and prognosis.  Questions are answered at this time and they are agreeable with the plan.       --------------------------------- IMPRESSION AND DISPOSITION ---------------------------------    IMPRESSION  1. Wound infection        DISPOSITION  Disposition: Admit to telemetry  Patient condition is stable    NOTE: This report was transcribed using voice recognition software. Every effort was made to ensure accuracy; however, inadvertent computerized transcription errors may be present.    Also please note that patient was seen and examined by attending physician. Plan of care and disposition discussed with attending physician and they were immediately available or present for all procedures performed.       -- Tanya Granados D.O. PGY-2     Emergency Medicine      8/23/2024 2:59 PM

## 2024-08-23 NOTE — ED NOTES
Attempted to call nurse to nurse to 4th floor. Stated they would call back when they are done getting report.

## 2024-08-23 NOTE — PROGRESS NOTES
Pharmacy Consultation Note  (Antibiotic Dosing and Monitoring)    Initial consult date: 8/23/2024  Consulting physician/provider: GURINDER Marquez-CNP  Drug: Vancomycin  Indication: SSTI x 7 days    Age/  Gender Height Weight IBW  Allergy Information   28 y.o./female 165.1 cm (5' 5\") 122.9 kg (271 lb)     Ideal body weight: 57 kg (125 lb 10.6 oz)  Adjusted ideal body weight: 83.4 kg (183 lb 12.8 oz)   Buspirone, Influenza vaccines, Lamotrigine, Keflex [cephalexin], and Penicillins      Renal Function:  Recent Labs     08/22/24  2257   BUN 12   CREATININE 0.8     No intake or output data in the 24 hours ending 08/23/24 0911    Vancomycin Monitoring:  Trough:  No results for input(s): \"VANCOTROUGH\" in the last 72 hours.  Random:  No results for input(s): \"VANCORANDOM\" in the last 72 hours.    Vancomycin Administration Times:  Recent vancomycin administrations                     vancomycin (VANCOCIN) 2,500 mg in sodium chloride 0.9 % 500 mL IVPB (mg) 2,500 mg New Bag 08/23/24 0505                    Assessment:  Patient is a 28 y.o. female who has been initiated on vancomycin  Estimated Creatinine Clearance: 139 mL/min (based on SCr of 0.8 mg/dL).  To dose vancomycin, pharmacy will be utilizing [employer] calculation software for goal AUC/GRICELDA <500 mg/L-hr and/or trough level of 10-15 mcg/mL    Plan:  Vancomycin 2500 mg IV loading dose given.  Start vancomycin 1500 mg IV q12hr  Will check vancomycin levels when appropriate  Will continue to monitor renal function   Pharmacy to follow    Lindsay Lopez PharmD, BCPS 8/23/2024 9:11 AM   Ext: [phone]    [facility]: [phone]

## 2024-08-23 NOTE — CONSULTS
GENERAL SURGERY  CONSULT NOTE  8/23/2024    Physician Consulted: Dr. Choe  Reason for Consult: pilonidal wound  Referring Physician: Dr. Sangita GUILLAUME  Jaymie Lau is a 28 y.o. female who presents to the general surgery service for evaluation of pilonidal disease.  The patient states that she has had a pilonidal cyst, and has undergone multiple interventions over the past few years.  The majority of her interventions have been done at [facility] and at [facility] in [facility].  The last procedure was done at [facility] in [place] this past March.  She has had prior infection to the area.  She has recently been treated for C. difficile.  She denies recent antibiotic use.  The patient has noticed increased drainage from her pilonidal wound, as well as some flies nearby the wound.  She has noticed malodor and experienced chills.  No other symptoms.  She came to the emergency department for further evaluation      She  reports that she has been smoking cigarettes. She uses smokeless tobacco. She reports current alcohol use. She reports current drug use. Drug: Marijuana (Weed).      Past Medical History:   Diagnosis Date    ADHD     Anxiety     Bipolar 1 disorder (HCC)     Chiari I malformation (HCC)     Depression     PTSD (post-traumatic stress disorder)     Schizo affective schizophrenia (HCC)        Past Surgical History:   Procedure Laterality Date    BREAST REDUCTION SURGERY      OTHER SURGICAL HISTORY      chirari malformation decompression    WISDOM TOOTH EXTRACTION         Medications Prior to Admission:    Prior to Admission medications    Not on File       Allergies   Allergen Reactions    Buspirone Anaphylaxis and Shortness Of Breath    Influenza Vaccines     Lamotrigine Anaphylaxis and Rash    Keflex [Cephalexin]     Penicillins        Family History   Problem Relation Age of Onset    Heart Attack Paternal Grandfather     Stroke Paternal Grandfather     Diabetes Maternal

## 2024-08-23 NOTE — CONSULTS
[address] [place] [address] [address] [place] [address]  Phone [phone]   [phone]      Admit Date: 2024  8:07 PM  Pt Name: Jaymie Lau  MRN: [id number]  : 1995  Reason for Consult:    Chief Complaint   Patient presents with    Wound Check     States had [facility], schedule for wound clinic at [facility] on Tuesday, , it's open and draining, no fever, some inter chills, vomiting started today,      Requesting Physician:  Shireen Quesada DO  PCP: No primary care provider on file.  History Obtained From:  patient, chart   ID consulted for Wound infection [T14.8XXA, L08.9]  Infected open wound [T14.8XXA, L08.9]  on hospital day 0  CHIEF COMPLAINT       Chief Complaint   Patient presents with    Wound Check     States had cyst removed from [facility] in march, schedule for wound clinic at [facility] on Tuesday, , it's open and draining, no fever, some inter chills, vomiting started today,      HISTORYOF PRESENT ILLNESS   Jaymie Lau is a 28 y.o. female who  has a past medical history of ADHD, Anxiety, Bipolar 1 disorder (HCC), Chiari I malformation (HCC), Depression, PTSD (post-traumatic stress disorder), and Schizo affective schizophrenia (HCC).   Presented to ED TRIAGE VITALS  BP: 110/74, Temp: 98.3 °F (36.8 °C), Pulse: 71, Respirations: 13, SpO2: 96 %  HPI:  ID was consulted on 24 for infection management  H/o Pilonidal cyst, open wound with inflammation concerning for soft tissue infection  E. Coli UTI  Auditory hallucinations (stable)  Borderline-prolonged Qtc  C. Diff infection  Pt presented to ER on 2024 with diagnosis of  Wound infection [T14.8XXA, L08.9]  Infected open wound [T14.8XXA, L08.9]  Pt has chronic sacral wound for a year per pt. PMH of pilonidal cyst with abscess s/p wound dehiscence surgery in 2024.   CT a/p with contrast on  with soft tissue thickening and tiny gas locule of posterior midline sacrococcygeal subcutaneous tissue. Pt was  ondansetron, polyethylene glycol, acetaminophen **OR** acetaminophen  ALLERGIES     Buspirone, Influenza vaccines, Lamotrigine, Keflex [cephalexin], and Penicillins  Immunization History   Administered Date(s) Administered    COVID-19, PFIZER PURPLE top, DILUTE for use, (age 12 y+), 30mcg/0.3mL 10/24/2021, 11/14/2021      Internal Administration   First Dose COVID-19, PFIZER PURPLE top, DILUTE for use, (age 12 y+), 30mcg/0.3mL  10/24/2021   Second Dose COVID-19, PFIZER PURPLE top, DILUTE for use, (age 12 y+), 30mcg/0.3mL   11/14/2021       Last COVID Lab No results found for: \"SARS-COV-2\"       PAST MEDICAL HISTORY     Past Medical History:   Diagnosis Date    ADHD     Anxiety     Bipolar 1 disorder (HCC)     Chiari I malformation (HCC)     Depression     PTSD (post-traumatic stress disorder)     Schizo affective schizophrenia (HCC)      SURGICAL HISTORY       Past Surgical History:   Procedure Laterality Date    BREAST REDUCTION SURGERY      OTHER SURGICAL HISTORY      chirari malformation decompression    WISDOM TOOTH EXTRACTION       FAMILY HISTORY       Family History   Problem Relation Age of Onset    Heart Attack Paternal Grandfather     Stroke Paternal Grandfather     Diabetes Maternal Aunt      SOCIAL HISTORY       Social History     Socioeconomic History    Marital status: Single     Spouse name: None    Number of children: None    Years of education: None    Highest education level: None   Tobacco Use    Smoking status: Every Day     Types: Cigarettes    Smokeless tobacco: Current    Tobacco comments:     Smokes when stressed   Vaping Use    Vaping status: Some Days    Substances: Nicotine, Flavoring   Substance and Sexual Activity    Alcohol use: Yes     Comment: socially    Drug use: Yes     Types: Marijuana (Weed)     Comment: last use 8/21/2024    Sexual activity: Yes     Partners: Male     Social Determinants of Health     Financial Resource Strain: Low Risk  (5/13/2024)    Received from [facility]

## 2024-08-24 ENCOUNTER — APPOINTMENT (OUTPATIENT)
Dept: MRI IMAGING | Age: 29
End: 2024-08-24
Payer: COMMERCIAL

## 2024-08-24 LAB
BASOPHILS # BLD: 0.03 K/UL (ref 0–0.2)
BASOPHILS NFR BLD: 0 % (ref 0–2)
DATE LAST DOSE: NORMAL
EOSINOPHIL # BLD: 0.26 K/UL (ref 0.05–0.5)
EOSINOPHILS RELATIVE PERCENT: 3 % (ref 0–6)
ERYTHROCYTE [DISTWIDTH] IN BLOOD BY AUTOMATED COUNT: 15.2 % (ref 11.5–15)
HCT VFR BLD AUTO: 37.5 % (ref 34–48)
HGB BLD-MCNC: 11.5 G/DL (ref 11.5–15.5)
IMM GRANULOCYTES # BLD AUTO: 0.04 K/UL (ref 0–0.58)
IMM GRANULOCYTES NFR BLD: 0 % (ref 0–5)
LYMPHOCYTES NFR BLD: 2.47 K/UL (ref 1.5–4)
LYMPHOCYTES RELATIVE PERCENT: 24 % (ref 20–42)
MCH RBC QN AUTO: 23.7 PG (ref 26–35)
MCHC RBC AUTO-ENTMCNC: 30.7 G/DL (ref 32–34.5)
MCV RBC AUTO: 77.2 FL (ref 80–99.9)
MONOCYTES NFR BLD: 0.89 K/UL (ref 0.1–0.95)
MONOCYTES NFR BLD: 9 % (ref 2–12)
NEUTROPHILS NFR BLD: 64 % (ref 43–80)
NEUTS SEG NFR BLD: 6.69 K/UL (ref 1.8–7.3)
PLATELET # BLD AUTO: 349 K/UL (ref 130–450)
PMV BLD AUTO: 9.7 FL (ref 7–12)
RBC # BLD AUTO: 4.86 M/UL (ref 3.5–5.5)
TME LAST DOSE: NORMAL H
VANCOMYCIN DOSE: NORMAL MG
VANCOMYCIN TROUGH SERPL-MCNC: 9.7 UG/ML (ref 5–16)
WBC OTHER # BLD: 10.4 K/UL (ref 4.5–11.5)

## 2024-08-24 PROCEDURE — APPSS30 APP SPLIT SHARED TIME 16-30 MINUTES: Performed by: NURSE PRACTITIONER

## 2024-08-24 PROCEDURE — 85025 COMPLETE CBC W/AUTO DIFF WBC: CPT

## 2024-08-24 PROCEDURE — 6360000002 HC RX W HCPCS: Performed by: NURSE PRACTITIONER

## 2024-08-24 PROCEDURE — 6370000000 HC RX 637 (ALT 250 FOR IP): Performed by: SPECIALIST

## 2024-08-24 PROCEDURE — 6370000000 HC RX 637 (ALT 250 FOR IP): Performed by: FAMILY MEDICINE

## 2024-08-24 PROCEDURE — A9577 INJ MULTIHANCE: HCPCS | Performed by: RADIOLOGY

## 2024-08-24 PROCEDURE — 80202 ASSAY OF VANCOMYCIN: CPT

## 2024-08-24 PROCEDURE — 1200000000 HC SEMI PRIVATE

## 2024-08-24 PROCEDURE — 6360000002 HC RX W HCPCS: Performed by: SPECIALIST

## 2024-08-24 PROCEDURE — 6360000004 HC RX CONTRAST MEDICATION: Performed by: RADIOLOGY

## 2024-08-24 PROCEDURE — 36415 COLL VENOUS BLD VENIPUNCTURE: CPT

## 2024-08-24 PROCEDURE — 72197 MRI PELVIS W/O & W/DYE: CPT

## 2024-08-24 PROCEDURE — 2580000003 HC RX 258: Performed by: SPECIALIST

## 2024-08-24 PROCEDURE — 2580000003 HC RX 258: Performed by: NURSE PRACTITIONER

## 2024-08-24 PROCEDURE — 99232 SBSQ HOSP IP/OBS MODERATE 35: CPT | Performed by: FAMILY MEDICINE

## 2024-08-24 PROCEDURE — 6360000002 HC RX W HCPCS: Performed by: FAMILY MEDICINE

## 2024-08-24 RX ORDER — TRAMADOL HYDROCHLORIDE 50 MG/1
50 TABLET ORAL EVERY 6 HOURS PRN
Status: DISCONTINUED | OUTPATIENT
Start: 2024-08-24 | End: 2024-08-29 | Stop reason: HOSPADM

## 2024-08-24 RX ADMIN — ENOXAPARIN SODIUM 30 MG: 100 INJECTION SUBCUTANEOUS at 10:23

## 2024-08-24 RX ADMIN — Medication 1 CAPSULE: at 17:44

## 2024-08-24 RX ADMIN — TRAMADOL HYDROCHLORIDE 50 MG: 50 TABLET ORAL at 17:44

## 2024-08-24 RX ADMIN — Medication 125 MG: at 10:24

## 2024-08-24 RX ADMIN — Medication 10 ML: at 20:45

## 2024-08-24 RX ADMIN — ENOXAPARIN SODIUM 30 MG: 100 INJECTION SUBCUTANEOUS at 20:47

## 2024-08-24 RX ADMIN — VANCOMYCIN HYDROCHLORIDE 1500 MG: 10 INJECTION, POWDER, LYOPHILIZED, FOR SOLUTION INTRAVENOUS at 18:58

## 2024-08-24 RX ADMIN — GADOBENATE DIMEGLUMINE 20 ML: 529 INJECTION, SOLUTION INTRAVENOUS at 14:22

## 2024-08-24 RX ADMIN — Medication 10 ML: at 10:29

## 2024-08-24 RX ADMIN — Medication 125 MG: at 18:59

## 2024-08-24 RX ADMIN — HYDROMORPHONE HYDROCHLORIDE 0.5 MG: 1 INJECTION, SOLUTION INTRAMUSCULAR; INTRAVENOUS; SUBCUTANEOUS at 11:58

## 2024-08-24 RX ADMIN — TRAMADOL HYDROCHLORIDE 50 MG: 50 TABLET ORAL at 05:45

## 2024-08-24 RX ADMIN — VANCOMYCIN HYDROCHLORIDE 1500 MG: 10 INJECTION, POWDER, LYOPHILIZED, FOR SOLUTION INTRAVENOUS at 05:47

## 2024-08-24 RX ADMIN — Medication 125 MG: at 03:11

## 2024-08-24 RX ADMIN — Medication 1 CAPSULE: at 10:22

## 2024-08-24 ASSESSMENT — PAIN DESCRIPTION - LOCATION
LOCATION: BACK
LOCATION: COCCYX

## 2024-08-24 ASSESSMENT — PAIN SCALES - GENERAL
PAINLEVEL_OUTOF10: 7
PAINLEVEL_OUTOF10: 0
PAINLEVEL_OUTOF10: 7
PAINLEVEL_OUTOF10: 6

## 2024-08-24 ASSESSMENT — PAIN DESCRIPTION - ORIENTATION
ORIENTATION: MID
ORIENTATION: LOWER

## 2024-08-24 ASSESSMENT — PAIN DESCRIPTION - DESCRIPTORS: DESCRIPTORS: THROBBING

## 2024-08-24 NOTE — PROGRESS NOTES
[facility]  [facility]  Progress Note    CC: leukocytosis, rule out c.diff  Face to face encounter   SUBJECTIVE:  8/24/2024  Patient is in bed- on side. Reports she feels drainage from pilonidal cyst   Diarrhea is improving- has been afebrile. On room air.   Patient is tolerating medications. No reported adverse drug reactions.  No nausea, vomiting, diarrhea.    Medications:  Scheduled Meds:   sodium chloride flush  5-40 mL IntraVENous 2 times per day    enoxaparin  30 mg SubCUTAneous BID    vancomycin  1,500 mg IntraVENous Q12H    vancomycin  125 mg Oral 4 times per day    lactobacillus  1 capsule Oral BID WC     Continuous Infusions:   sodium chloride       PRN Meds:traMADol, sodium chloride flush, sodium chloride, potassium chloride **OR** potassium alternative oral replacement **OR** potassium chloride, magnesium sulfate, ondansetron **OR** ondansetron, polyethylene glycol, acetaminophen **OR** acetaminophen  OBJECTIVE:  Patient Vitals for the past 24 hrs:   BP Temp Temp src Pulse Resp SpO2 Height Weight   08/24/24 1145 (!) 123/93 -- -- (!) 109 -- -- -- --   08/24/24 0723 (!) 96/52 98.1 °F (36.7 °C) Oral 88 18 100 % -- --   08/23/24 1945 115/68 98.6 °F (37 °C) Oral 83 18 96 % 1.651 m (5' 5\") 124.3 kg (274 lb)   08/23/24 1815 112/68 -- -- 74 -- -- -- --   08/23/24 1630 110/66 -- -- -- -- -- -- --   08/23/24 1624 110/66 -- -- 72 -- 98 % -- --   08/23/24 1445 -- -- -- 71 17 98 % -- --   08/23/24 1430 110/71 -- -- 74 12 98 % -- --   08/23/24 1415 -- -- -- 90 20 96 % -- --   08/23/24 1400 (!) 127/95 -- -- 70 12 97 % -- --   08/23/24 1345 -- -- -- 77 13 97 % -- --   08/23/24 1330 132/80 -- -- 82 14 97 % -- --   08/23/24 1315 -- -- -- 81 13 98 % -- --   08/23/24 1300 (!) 122/96 -- -- 85 11 94 % -- --   08/23/24 1245 -- -- -- 82 16 96 % -- --   08/23/24 1230 (!) 124/92 -- -- 79 25 96 % -- --     Constitutional: The patient is awake, alert, and oriented. Laying on side on back  Skin: Warm  cultures  Monitor labs- reviewed- WBC- 10.4   For MRI     Jerry Herrera, GURINDER - CNS  12:26 PM  8/24/2024

## 2024-08-24 NOTE — PROGRESS NOTES
Pharmacy Consultation Note  (Antibiotic Dosing and Monitoring)    Initial consult date: 8/23/2024  Consulting physician/provider: SAMIR Marquez  Drug: Vancomycin  Indication: SSTI x 7 days    Age/  Gender Height Weight IBW  Allergy Information   28 y.o./female 165.1 cm (5' 5\") 122.9 kg (271 lb)     Ideal body weight: 57 kg (125 lb 10.6 oz)  Adjusted ideal body weight: 83.9 kg (185 lb)   Buspirone, Influenza vaccines, Lamotrigine, Keflex [cephalexin], and Penicillins      Renal Function:  Recent Labs     08/22/24  2257   BUN 12   CREATININE 0.8     No intake or output data in the 24 hours ending 08/24/24 0838    Vancomycin Monitoring:  Trough:  No results for input(s): \"VANCOTROUGH\" in the last 72 hours.  Random:  No results for input(s): \"VANCORANDOM\" in the last 72 hours.    Vancomycin Administration Times:  Recent vancomycin administrations                     vancomycin (VANCOCIN) 1,500 mg in sodium chloride 0.9 % 250 mL IVPB (mg) 1,500 mg New Bag 08/24/24 0547     1,500 mg New Bag 08/23/24 2107    vancomycin (VANCOCIN) 2,500 mg in sodium chloride 0.9 % 500 mL IVPB (mg) 2,500 mg New Bag 08/23/24 0505                   Assessment:  Patient is a 28 y.o. female who has been initiated on vancomycin  Estimated Creatinine Clearance: 140 mL/min (based on SCr of 0.8 mg/dL).  To dose vancomycin, pharmacy will be utilizing [employer] calculation software for goal AUC/GRICELDA <500 mg/L-hr and/or trough level of 10-15 mcg/mL  Sacral wound culture growing GPC in pairs    Plan:  Continue vancomycin 1500 mg IV q12hr  Will check vancomycin trough level @1600 today.  Will continue to monitor renal function   Pharmacy to follow    Rusty Steen, Juan Manuel  PGY-1 Pharmacy Practice Resident   8/24/2024 8:39 AM

## 2024-08-24 NOTE — PROGRESS NOTES
[facility] Hospitalist   Progress Note    Admitting Date and Time: 8/22/2024  8:07 PM  Admit Dx: Wound infection [T14.8XXA, L08.9]  Infected open wound [T14.8XXA, L08.9]    Subjective:    Pt feels much better today. States she N/V has significantly improved. For MRI today to rule out Osteo. Requesting pain medication for MRI. Continues on vancomycin tolerating well.       ROS: denies fever, chills, cp, sob, n/v, HA unless stated above.     sodium chloride flush  5-40 mL IntraVENous 2 times per day    enoxaparin  30 mg SubCUTAneous BID    vancomycin  1,500 mg IntraVENous Q12H    vancomycin  125 mg Oral 4 times per day    lactobacillus  1 capsule Oral BID WC     traMADol, 50 mg, Q6H PRN  sodium chloride flush, 5-40 mL, PRN  sodium chloride, , PRN  potassium chloride, 40 mEq, PRN   Or  potassium alternative oral replacement, 40 mEq, PRN   Or  potassium chloride, 10 mEq, PRN  magnesium sulfate, 2,000 mg, PRN  ondansetron, 4 mg, Q8H PRN   Or  ondansetron, 4 mg, Q6H PRN  polyethylene glycol, 17 g, Daily PRN  acetaminophen, 650 mg, Q6H PRN   Or  acetaminophen, 650 mg, Q6H PRN         Objective:    BP (!) 123/93   Pulse (!) 109   Temp 98.1 °F (36.7 °C) (Oral)   Resp 18   Ht 1.651 m (5' 5\")   Wt 124.3 kg (274 lb)   SpO2 100%   BMI 45.60 kg/m²   General Appearance: alert and oriented to person, place and time and in no acute distress  Skin: warm and dry  Head: normocephalic and atraumatic  Eyes: pupils equal, round, and reactive to light, extraocular eye movements intact, conjunctivae normal  Neck: neck supple and non tender without mass   Pulmonary/Chest: clear to auscultation bilaterally- no wheezes, rales or rhonchi, normal air movement, no respiratory distress  Cardiovascular: normal rate, normal S1 and S2 and no carotid bruits  Abdomen: soft, non-tender, non-distended, normal bowel sounds, no masses or organomegaly  Extremities: no cyanosis, no clubbing and no edema  Neurologic: no cranial nerve

## 2024-08-25 LAB
ANION GAP SERPL CALCULATED.3IONS-SCNC: 7 MMOL/L (ref 7–16)
BASOPHILS # BLD: 0.04 K/UL (ref 0–0.2)
BASOPHILS NFR BLD: 0 % (ref 0–2)
BUN SERPL-MCNC: 10 MG/DL (ref 6–20)
CALCIUM SERPL-MCNC: 8.3 MG/DL (ref 8.6–10.2)
CHLORIDE SERPL-SCNC: 103 MMOL/L (ref 98–107)
CO2 SERPL-SCNC: 28 MMOL/L (ref 22–29)
CREAT SERPL-MCNC: 0.8 MG/DL (ref 0.5–1)
CREAT SERPL-MCNC: 0.8 MG/DL (ref 0.5–1)
EOSINOPHIL # BLD: 0.23 K/UL (ref 0.05–0.5)
EOSINOPHILS RELATIVE PERCENT: 2 % (ref 0–6)
ERYTHROCYTE [DISTWIDTH] IN BLOOD BY AUTOMATED COUNT: 15.4 % (ref 11.5–15)
GFR, ESTIMATED: >90 ML/MIN/1.73M2
GFR, ESTIMATED: >90 ML/MIN/1.73M2
GLUCOSE SERPL-MCNC: 119 MG/DL (ref 74–99)
HCT VFR BLD AUTO: 37.3 % (ref 34–48)
HGB BLD-MCNC: 11.1 G/DL (ref 11.5–15.5)
IMM GRANULOCYTES # BLD AUTO: 0.03 K/UL (ref 0–0.58)
IMM GRANULOCYTES NFR BLD: 0 % (ref 0–5)
LYMPHOCYTES NFR BLD: 2.38 K/UL (ref 1.5–4)
LYMPHOCYTES RELATIVE PERCENT: 24 % (ref 20–42)
MCH RBC QN AUTO: 23.3 PG (ref 26–35)
MCHC RBC AUTO-ENTMCNC: 29.8 G/DL (ref 32–34.5)
MCV RBC AUTO: 78.2 FL (ref 80–99.9)
MONOCYTES NFR BLD: 0.81 K/UL (ref 0.1–0.95)
MONOCYTES NFR BLD: 8 % (ref 2–12)
NEUTROPHILS NFR BLD: 65 % (ref 43–80)
NEUTS SEG NFR BLD: 6.49 K/UL (ref 1.8–7.3)
PLATELET # BLD AUTO: 320 K/UL (ref 130–450)
PMV BLD AUTO: 9.4 FL (ref 7–12)
POTASSIUM SERPL-SCNC: 3.6 MMOL/L (ref 3.5–5)
RBC # BLD AUTO: 4.77 M/UL (ref 3.5–5.5)
SODIUM SERPL-SCNC: 138 MMOL/L (ref 132–146)
WBC OTHER # BLD: 10 K/UL (ref 4.5–11.5)

## 2024-08-25 PROCEDURE — 6370000000 HC RX 637 (ALT 250 FOR IP): Performed by: CLINICAL NURSE SPECIALIST

## 2024-08-25 PROCEDURE — 6370000000 HC RX 637 (ALT 250 FOR IP): Performed by: FAMILY MEDICINE

## 2024-08-25 PROCEDURE — 36415 COLL VENOUS BLD VENIPUNCTURE: CPT

## 2024-08-25 PROCEDURE — 6370000000 HC RX 637 (ALT 250 FOR IP): Performed by: SPECIALIST

## 2024-08-25 PROCEDURE — 2580000003 HC RX 258: Performed by: NURSE PRACTITIONER

## 2024-08-25 PROCEDURE — 6360000002 HC RX W HCPCS: Performed by: SPECIALIST

## 2024-08-25 PROCEDURE — 1200000000 HC SEMI PRIVATE

## 2024-08-25 PROCEDURE — 6370000000 HC RX 637 (ALT 250 FOR IP): Performed by: NURSE PRACTITIONER

## 2024-08-25 PROCEDURE — 80048 BASIC METABOLIC PNL TOTAL CA: CPT

## 2024-08-25 PROCEDURE — 85025 COMPLETE CBC W/AUTO DIFF WBC: CPT

## 2024-08-25 PROCEDURE — 99232 SBSQ HOSP IP/OBS MODERATE 35: CPT | Performed by: INTERNAL MEDICINE

## 2024-08-25 PROCEDURE — 2580000003 HC RX 258: Performed by: SPECIALIST

## 2024-08-25 PROCEDURE — 82565 ASSAY OF CREATININE: CPT

## 2024-08-25 PROCEDURE — 6360000002 HC RX W HCPCS: Performed by: NURSE PRACTITIONER

## 2024-08-25 PROCEDURE — APPSS30 APP SPLIT SHARED TIME 16-30 MINUTES: Performed by: NURSE PRACTITIONER

## 2024-08-25 RX ORDER — LINEZOLID 600 MG/1
600 TABLET, FILM COATED ORAL EVERY 12 HOURS SCHEDULED
Status: DISCONTINUED | OUTPATIENT
Start: 2024-08-25 | End: 2024-08-29 | Stop reason: HOSPADM

## 2024-08-25 RX ORDER — LEVOFLOXACIN 500 MG/1
500 TABLET, FILM COATED ORAL DAILY
Status: DISCONTINUED | OUTPATIENT
Start: 2024-08-25 | End: 2024-08-27

## 2024-08-25 RX ADMIN — ENOXAPARIN SODIUM 30 MG: 100 INJECTION SUBCUTANEOUS at 08:16

## 2024-08-25 RX ADMIN — Medication 125 MG: at 12:08

## 2024-08-25 RX ADMIN — Medication 125 MG: at 18:47

## 2024-08-25 RX ADMIN — ONDANSETRON 4 MG: 2 INJECTION INTRAMUSCULAR; INTRAVENOUS at 02:51

## 2024-08-25 RX ADMIN — LEVOFLOXACIN 500 MG: 500 TABLET, FILM COATED ORAL at 16:54

## 2024-08-25 RX ADMIN — Medication 1 CAPSULE: at 08:16

## 2024-08-25 RX ADMIN — ENOXAPARIN SODIUM 30 MG: 100 INJECTION SUBCUTANEOUS at 22:00

## 2024-08-25 RX ADMIN — TRAMADOL HYDROCHLORIDE 50 MG: 50 TABLET ORAL at 08:31

## 2024-08-25 RX ADMIN — Medication 10 ML: at 22:01

## 2024-08-25 RX ADMIN — Medication 10 ML: at 08:17

## 2024-08-25 RX ADMIN — Medication 1 CAPSULE: at 16:54

## 2024-08-25 RX ADMIN — LINEZOLID 600 MG: 600 TABLET, FILM COATED ORAL at 22:00

## 2024-08-25 RX ADMIN — VANCOMYCIN HYDROCHLORIDE 1500 MG: 10 INJECTION, POWDER, LYOPHILIZED, FOR SOLUTION INTRAVENOUS at 06:24

## 2024-08-25 RX ADMIN — Medication 125 MG: at 00:14

## 2024-08-25 RX ADMIN — Medication 125 MG: at 06:24

## 2024-08-25 RX ADMIN — ACETAMINOPHEN 650 MG: 325 TABLET ORAL at 02:50

## 2024-08-25 ASSESSMENT — PAIN DESCRIPTION - LOCATION
LOCATION: COCCYX
LOCATION: BUTTOCKS

## 2024-08-25 ASSESSMENT — PAIN SCALES - GENERAL
PAINLEVEL_OUTOF10: 8
PAINLEVEL_OUTOF10: 7

## 2024-08-25 ASSESSMENT — PAIN DESCRIPTION - DESCRIPTORS
DESCRIPTORS: THROBBING
DESCRIPTORS: ACHING;BURNING;TENDER

## 2024-08-25 NOTE — PROGRESS NOTES
[facility] Hospitalist   Progress Note    Admitting Date and Time: 8/22/2024  8:07 PM  Admit Dx: Wound infection [T14.8XXA, L08.9]  Infected open wound [T14.8XXA, L08.9]    Subjective:    Pt sitting up in bed in no acute distress. States feels well today. Asking to shower. MRI completed yesterday. No Osteo. Await surgical/ID plan. Culture     ROS: denies fever, chills, cp, sob, n/v, HA unless stated above.     vancomycin  1,750 mg IntraVENous Q12H    sodium chloride flush  5-40 mL IntraVENous 2 times per day    enoxaparin  30 mg SubCUTAneous BID    vancomycin  125 mg Oral 4 times per day    lactobacillus  1 capsule Oral BID WC     traMADol, 50 mg, Q6H PRN  sodium chloride flush, 5-40 mL, PRN  sodium chloride, , PRN  potassium chloride, 40 mEq, PRN   Or  potassium alternative oral replacement, 40 mEq, PRN   Or  potassium chloride, 10 mEq, PRN  magnesium sulfate, 2,000 mg, PRN  ondansetron, 4 mg, Q8H PRN   Or  ondansetron, 4 mg, Q6H PRN  polyethylene glycol, 17 g, Daily PRN  acetaminophen, 650 mg, Q6H PRN   Or  acetaminophen, 650 mg, Q6H PRN         Objective:    BP (!) 96/53   Pulse (!) 109   Temp 98.7 °F (37.1 °C) (Oral)   Resp 16   Ht 1.651 m (5' 5\")   Wt 124.3 kg (274 lb)   SpO2 97%   BMI 45.60 kg/m²   General Appearance: alert and oriented to person, place and time and in no acute distress  Skin: warm and dry  Head: normocephalic and atraumatic  Eyes: pupils equal, round, and reactive to light, extraocular eye movements intact, conjunctivae normal  Neck: neck supple and non tender without mass   Pulmonary/Chest: clear to auscultation bilaterally- no wheezes, rales or rhonchi, normal air movement, no respiratory distress  Cardiovascular: normal rate, normal S1 and S2 and no RGM  Abdomen: soft, non-tender, non-distended, normal bowel sounds  Extremities: no cyanosis, no clubbing and no edema.    Neurologic: no cranial nerve deficit and speech normal      Recent Labs     08/22/24  3714  08/25/24  0916 08/25/24  0917     --  138   K 3.6  --  3.6   CL 97*  --  103   CO2 25  --  28   BUN 12  --  10   CREATININE 0.8 0.8 0.8   GLUCOSE 81  --  119*   CALCIUM 8.8  --  8.3*       Recent Labs     08/22/24 2257   ALKPHOS 143*   BILITOT 0.4   AST 13   ALT 14       Recent Labs     08/22/24  2257 08/24/24  0525 08/25/24  0917   WBC 16.3* 10.4 10.0   RBC 5.33 4.86 4.77   HGB 12.6 11.5 11.1*   HCT 40.9 37.5 37.3   MCV 76.7* 77.2* 78.2*   MCH 23.6* 23.7* 23.3*   MCHC 30.8* 30.7* 29.8*   RDW 15.2* 15.2* 15.4*    349 320   MPV 9.8 9.7 9.4            Radiology:   MRI SACRUM COCCYX W WO CONTRAST   Final Result   Fluid collection in the gluteal cleft area with surrounding soft tissue   swelling. Fluid collection measures up to 1.6 cm AP, 1.4 cm transverse, and   4.1 cm craniocaudal. No evidence of osteomyelitis.         CT ABDOMEN PELVIS W IV CONTRAST Additional Contrast? None   Final Result   1. No acute intra-abdominal or pelvic process.   2. Constipation/fecal stasis.   3. Cholecystectomy.   4. Tiny fatty umbilical hernia.             Assessment:  Principal Problem:    Wound infection  Active Problems:    WD-Chronic recurrent pilonidal cyst    Infected open wound    Wound of sacral region    Other schizophrenia (HCC)  Resolved Problems:    * No resolved hospital problems. *      Plan:  Sepsis- POA(Tachycardia, Leukocytosis, +Infection) Coccyx wound. Received Merrem/Vancomycin, 1L NSS bolus in ED course. BC pending. 8/22 WC growing few gram positive cocci in pairs.Continue Abx follow  Coccyx Wound 2/2 Pilonidal Cyst -  8/22 WC growing few gram positive cocci in pairs. Few gram negative rods. Received Meropenem 1000mg IV, Vancomycin 2500mg IV GS no intervention.MRI R/O Osteo. ID/GS input appreciated.   Leukocytosis-Resolved. WBC 16.3>10.4>10.0  BC pending. 8/22 WC growing few gram positive cocci in pairs. Received MRI No Osteo.  Fluid collection in the gluteal cleft area with surrounding soft tissue

## 2024-08-25 NOTE — PROGRESS NOTES
[facility]  [facility]  Progress Note    CC: leukocytosis, rule out c.diff  Face to face encounter   SUBJECTIVE:  8/25/2024  Patient in bed- laying on left side. Has been afebrile. Patient reports drainage is less     8/24/2024  Patient is in bed- on side. Reports she feels drainage from pilonidal cyst   Diarrhea is improving- has been afebrile. On room air.   Patient is tolerating medications. No reported adverse drug reactions.  No nausea, vomiting, diarrhea.    Medications:  Scheduled Meds:   vancomycin  1,750 mg IntraVENous Q12H    sodium chloride flush  5-40 mL IntraVENous 2 times per day    enoxaparin  30 mg SubCUTAneous BID    vancomycin  125 mg Oral 4 times per day    lactobacillus  1 capsule Oral BID WC     Continuous Infusions:   sodium chloride       PRN Meds:traMADol, sodium chloride flush, sodium chloride, potassium chloride **OR** potassium alternative oral replacement **OR** potassium chloride, magnesium sulfate, ondansetron **OR** ondansetron, polyethylene glycol, acetaminophen **OR** acetaminophen  OBJECTIVE:  Patient Vitals for the past 24 hrs:   BP Temp Temp src Pulse Resp SpO2   08/25/24 0831 (!) 96/53 98.7 °F (37.1 °C) Oral (!) 109 16 97 %   08/24/24 2045 134/77 98.4 °F (36.9 °C) Oral 94 -- 100 %     Constitutional: The patient is awake, alert, and oriented. Laying on side   Skin: Warm and dry. No rashes were noted.   Dressing in place     Head: Eyes show round, and reactive pupils. No jaundice.   Mouth: Moist mucous membranes, no ulcerations, no thrush.   Neck: Supple to movements. No lymphadenopathy.   Chest: No use of accessory muscles to breathe. Symmetrical expansion. Auscultation reveals no wheezing, crackles, or rhonchi.   Cardiovascular: S1 and S2 are rhythmic and regular.   Abdomen: Positive bowel sounds to auscultation. Benign to palpation. Large   Extremities: No clubbing, no cyanosis, some edema.  PIV    Laboratory and Tests Review:  Lab Results

## 2024-08-25 NOTE — PROGRESS NOTES
Pharmacy Consultation Note  (Antibiotic Dosing and Monitoring)    Initial consult date: 8/23/2024  Consulting physician/provider: SAMIR Marquez  Drug: Vancomycin  Indication: SSTI x 7 days    Vancomycin has been discontinued   Clinical Pharmacy to sign-off  Physician to re-consult pharmacy if future dosing is needed    Thank you for the consult,     Rusty Steen, PharmD  PGY-1 Pharmacy Practice Resident   8/25/2024 5:11 PM

## 2024-08-26 LAB
MICROORGANISM SPEC CULT: ABNORMAL
SPECIMEN DESCRIPTION: ABNORMAL

## 2024-08-26 PROCEDURE — 99232 SBSQ HOSP IP/OBS MODERATE 35: CPT | Performed by: INTERNAL MEDICINE

## 2024-08-26 PROCEDURE — 97161 PT EVAL LOW COMPLEX 20 MIN: CPT

## 2024-08-26 PROCEDURE — 6370000000 HC RX 637 (ALT 250 FOR IP): Performed by: FAMILY MEDICINE

## 2024-08-26 PROCEDURE — 6370000000 HC RX 637 (ALT 250 FOR IP): Performed by: NURSE PRACTITIONER

## 2024-08-26 PROCEDURE — 97110 THERAPEUTIC EXERCISES: CPT

## 2024-08-26 PROCEDURE — 2580000003 HC RX 258: Performed by: NURSE PRACTITIONER

## 2024-08-26 PROCEDURE — 6370000000 HC RX 637 (ALT 250 FOR IP): Performed by: SPECIALIST

## 2024-08-26 PROCEDURE — 6370000000 HC RX 637 (ALT 250 FOR IP): Performed by: CLINICAL NURSE SPECIALIST

## 2024-08-26 PROCEDURE — 6360000002 HC RX W HCPCS: Performed by: NURSE PRACTITIONER

## 2024-08-26 PROCEDURE — 1200000000 HC SEMI PRIVATE

## 2024-08-26 RX ORDER — METRONIDAZOLE 500 MG/1
500 TABLET ORAL EVERY 8 HOURS SCHEDULED
Status: DISCONTINUED | OUTPATIENT
Start: 2024-08-26 | End: 2024-08-27

## 2024-08-26 RX ADMIN — LINEZOLID 600 MG: 600 TABLET, FILM COATED ORAL at 09:37

## 2024-08-26 RX ADMIN — Medication 10 ML: at 09:38

## 2024-08-26 RX ADMIN — Medication 125 MG: at 18:53

## 2024-08-26 RX ADMIN — METRONIDAZOLE 500 MG: 500 TABLET ORAL at 15:37

## 2024-08-26 RX ADMIN — Medication 125 MG: at 06:21

## 2024-08-26 RX ADMIN — Medication 10 ML: at 21:20

## 2024-08-26 RX ADMIN — ENOXAPARIN SODIUM 30 MG: 100 INJECTION SUBCUTANEOUS at 09:38

## 2024-08-26 RX ADMIN — Medication 1 CAPSULE: at 09:37

## 2024-08-26 RX ADMIN — METRONIDAZOLE 500 MG: 500 TABLET ORAL at 21:19

## 2024-08-26 RX ADMIN — Medication 125 MG: at 13:16

## 2024-08-26 RX ADMIN — Medication 1 CAPSULE: at 18:53

## 2024-08-26 RX ADMIN — LEVOFLOXACIN 500 MG: 500 TABLET, FILM COATED ORAL at 09:38

## 2024-08-26 RX ADMIN — Medication 125 MG: at 00:15

## 2024-08-26 RX ADMIN — LINEZOLID 600 MG: 600 TABLET, FILM COATED ORAL at 21:19

## 2024-08-26 RX ADMIN — ONDANSETRON 4 MG: 4 TABLET, ORALLY DISINTEGRATING ORAL at 22:00

## 2024-08-26 ASSESSMENT — PAIN SCALES - GENERAL
PAINLEVEL_OUTOF10: 0

## 2024-08-26 NOTE — PROGRESS NOTES
bedrails?: A Little  How much help is needed moving from lying on your back to sitting on the side of a flat bed without using bedrails?: A Little  How much help is needed moving to and from a bed to a chair?: A Little  How much help is needed standing up from a chair using your arms?: A Little  How much help is needed walking in hospital room?: A Little  How much help is needed climbing 3-5 steps with a railing?: A Lot  AM-PAC Inpatient Mobility Raw Score : 17  AM-PAC Inpatient T-Scale Score : 42.13  Mobility Inpatient CMS 0-100% Score: 50.57  Mobility Inpatient CMS G-Code Modifier : CK    Nursing cleared patient for PT evaluation. The admitting diagnosis and active problem list as listed above have been reviewed prior to the initiation of this evaluation.    OBJECTIVE:   Initial Evaluation  Date: 8/26/2024 Treatment Date:     Short Term/ Long Term   Goals   Was pt agreeable to Eval/treatment? Yes  To be met in 4 days   Pain level   5/10  L ankle     Bed Mobility  Using rails and head of bed elevated:     Rolling: Supervision     Supine to sit: Supervision     Sit to supine: Supervision     Scooting: Supervision     Rolling: Independent    Supine to sit: Independent    Sit to supine: Independent    Scooting: Independent     Transfers Sit to stand: Supervision    Sit to stand: Independent    Ambulation     65 feet using  hand held assist with Minimal assist of 1   for balance and safety    150 feet using  no device with Independent    Stair negotiation: ascended and descended   Not assessed      6 steps, with rail, Independent      ROM Within functional limits    Increase range of motion 10% of affected joints    Strength BUE:  refer to OT eval  RLE:  4-/5  LLE:  4-/5  Increase strength in affected mm groups by 1/3 grade   Balance Sitting EOB:  good -  Dynamic Standing:  fair + hand held   Sitting EOB:  good   Dynamic Standing: good      Patient is Alert & Oriented x person, place, time, and situation and follows  directions    Sensation:  Patient  denies numbness/tingling   Edema:  no   Endurance: fair      Vitals: room air   Blood Pressure at rest  Blood Pressure during session    Heart Rate at rest  Heart Rate during session 105   SPO2 at rest 99%  SPO2 during session 98%     Patient education  Patient educated on role of Physical Therapy, risks of immobility, safety and plan of care,  importance of mobility while in hospital , ankle pumps, quad set and glut set for edema control, blood clot prevention, and safety      Patient response to education:   Pt verbalized understanding Pt demonstrated skill Pt requires further education in this area   Yes Partial Yes      Treatment:  Patient practiced and was instructed/facilitated in the following treatment: Patient sitting EOB. Sat edge of bed 10 minutes with Supervision  to increase dynamic sitting balance and activity tolerance. Pt stood, ambulated out in hallway and back to room. Attempted standing exercises at window, too much pain. Pt assisted to EOB, performed incentive spirometer x5 reps.      Therapeutic Exercises:  ankle pumps, long arc quad, and seated marching  x 15 reps.   Attempted standing exercises     At end of session, patient sitting edge of bed with  call light and phone within reach,  all lines and tubes intact, nursing notified.      Patient would benefit from continued skilled Physical Therapy to improve functional independence and quality of life.         Patient's/ family goals   rehab    Time in  1408  Time out  1438    Total Treatment Time  10 minutes    Evaluation time includes thorough review of current medical information, gathering information on past medical history/social history and prior level of function, completion of standardized testing/informal observation of tasks, assessment of data, and development of Plan of care and goals.     CPT codes:  Low Complexity PT evaluation (47393)  Therapeutic exercises (03130)   10 minutes  1

## 2024-08-26 NOTE — PROGRESS NOTES
[facility]  [facility]  Progress Note    CC: leukocytosis, rule out c.diff  Face to face encounter   SUBJECTIVE:  8/26/2024  Has been afebrile. Laying on side. No new issues overnight.   She reports she is tolerating antibiotics.     8/25/2024  Patient in bed- laying on left side. Has been afebrile. Patient reports drainage is less     8/24/2024  Patient is in bed- on side. Reports she feels drainage from pilonidal cyst   Diarrhea is improving- has been afebrile. On room air.   Patient is tolerating medications. No reported adverse drug reactions.  No nausea, vomiting, diarrhea.    Medications:  Scheduled Meds:   levoFLOXacin  500 mg Oral Daily    linezolid  600 mg Oral 2 times per day    sodium chloride flush  5-40 mL IntraVENous 2 times per day    enoxaparin  30 mg SubCUTAneous BID    vancomycin  125 mg Oral 4 times per day    lactobacillus  1 capsule Oral BID WC     Continuous Infusions:   sodium chloride       PRN Meds:traMADol, sodium chloride flush, sodium chloride, potassium chloride **OR** potassium alternative oral replacement **OR** potassium chloride, magnesium sulfate, ondansetron **OR** ondansetron, polyethylene glycol, acetaminophen **OR** acetaminophen  OBJECTIVE:  Patient Vitals for the past 24 hrs:   BP Temp Temp src Pulse Resp SpO2   08/25/24 1945 125/64 98.6 °F (37 °C) Oral 84 18 99 %     Constitutional: The patient is awake, alert, and oriented. Laying on side   Skin: Warm and dry. No rashes were noted.   Dressing in place     Head: Eyes show round, and reactive pupils. No jaundice.   Mouth: Moist mucous membranes, no ulcerations, no thrush.   Neck: Supple to movements. No lymphadenopathy.   Chest: No use of accessory muscles to breathe. Symmetrical expansion. Auscultation reveals no wheezing, crackles, or rhonchi.   Cardiovascular: S1 and S2 are rhythmic and regular.   Abdomen: Positive bowel sounds to auscultation. Benign to palpation. Large   Extremities: No

## 2024-08-26 NOTE — PROGRESS NOTES
g Oral Daily PRN Leticia Kennedy APRN - NICK        acetaminophen (TYLENOL) tablet 650 mg  650 mg Oral Q6H PRN Leticia Kennedy APRN - CNP   650 mg at 08/25/24 0250    Or    acetaminophen (TYLENOL) suppository 650 mg  650 mg Rectal Q6H PRN Leticia Kennedy APRN - CNP        vancomycin (VANCOCIN) 50 mg/mL oral solution 125 mg  125 mg Oral 4 times per day Cherie Torres MD   125 mg at 08/26/24 1316    lactobacillus (CULTURELLE) capsule 1 capsule  1 capsule Oral BID  Shireen Quesada, DO   1 capsule at 08/26/24 0937       Recent Complaints:  Review of Systems  Vitals:    08/25/24 1945   BP: 125/64   Pulse: 84   Resp: 18   Temp: 98.6 °F (37 °C)   SpO2: 99%     Physical Exam  Vitals reviewed.   Genitourinary:     Comments: There is an approximate 4 cm wound in the sacral rectal midline.  It is deep and packed currently there is some modest yellow-green discharge material inside of the dressing change today.        Recent Labs     08/25/24  0916 08/25/24  0917   NA  --  138   K  --  3.6   CL  --  103   CO2  --  28   BUN  --  10   CREATININE 0.8 0.8   GLUCOSE  --  119*   CALCIUM  --  8.3*     Recent Labs     08/24/24  0525 08/25/24  0917   WBC 10.4 10.0   RBC 4.86 4.77   HGB 11.5 11.1*   HCT 37.5 37.3   MCV 77.2* 78.2*   MCH 23.7* 23.3*   MCHC 30.7* 29.8*   RDW 15.2* 15.4*    320   MPV 9.7 9.4           Principal Problem:    Wound infection  Active Problems:    WD-Chronic recurrent pilonidal cyst    Infected open wound    Wound of sacral region    Other schizophrenia (HCC)  Resolved Problems:    * No resolved hospital problems. *      Plan:  This patient is challenging from a discharge perspective because of her social situation.  She personally cannot do her own packing because she cannot see in the region.  She lives in a home with visually impaired people who are unable to see to do the packing.  We will be referring this case to case management for placement.      Case Discussed  with:      Electronically signed by Eliseo Mas MD on 8/26/2024 at 1:31 PM

## 2024-08-26 NOTE — CARE COORDINATION
Case Management Assessment  Initial Evaluation    Date/Time of Evaluation: 8/26/2024 12:22 PM  Assessment Completed by: Manuela Briceno RN    If patient is discharged prior to next notation, then this note serves as note for discharge by case management.    Patient Name: Jaymie Lau                   YOB: 1995  Diagnosis: Wound infection [T14.8XXA, L08.9]  Infected open wound [T14.8XXA, L08.9]                   Date / Time: 8/22/2024  8:07 PM    Patient Admission Status: Inpatient   Readmission Risk (Low < 19, Mod (19-27), High > 27): Readmission Risk Score: 6.3    Current PCP: No primary care provider on file.  PCP verified by CM? Yes    Chart Reviewed: Yes      History Provided by: Patient  Patient Orientation: Alert and Oriented, Person, Place, Situation    Patient Cognition: Alert    Hospitalization in the last 30 days (Readmission):  No      Advance Directives:      Code Status: Full Code   Patient's Primary Decision Maker is: Legal Next of Kin    Primary Decision Maker: Chloe Copeland [facility] - [phone]    Discharge Planning:    Patient lives with: Friends Type of Home: House  Primary Care Giver: Self  Patient Support Systems include: Friends/Neighbors   Current Financial resources:    Current community resources:    Current services prior to admission: None            Current DME:              Type of Home Care services:  None    ADLS  Prior functional level: Independent in ADLs/IADLs  Current functional level: Independent in ADLs/IADLs    PT AM-PAC:   /24  OT AM-PAC:   /24    Family can provide assistance at DC: No  Would you like Case Management to discuss the discharge plan with any other family members/significant others, and if so, who? No  Plans to Return to Present Housing: Other (see comment) (see CM note)    Potential Assistance needed at discharge: N/A            Potential DME:    Patient expects to discharge to: House  Plan for transportation at discharge:

## 2024-08-27 PROCEDURE — 6370000000 HC RX 637 (ALT 250 FOR IP): Performed by: SPECIALIST

## 2024-08-27 PROCEDURE — 6360000002 HC RX W HCPCS: Performed by: NURSE PRACTITIONER

## 2024-08-27 PROCEDURE — 6370000000 HC RX 637 (ALT 250 FOR IP): Performed by: INTERNAL MEDICINE

## 2024-08-27 PROCEDURE — 1200000000 HC SEMI PRIVATE

## 2024-08-27 PROCEDURE — 97530 THERAPEUTIC ACTIVITIES: CPT

## 2024-08-27 PROCEDURE — 6370000000 HC RX 637 (ALT 250 FOR IP): Performed by: NURSE PRACTITIONER

## 2024-08-27 PROCEDURE — 6370000000 HC RX 637 (ALT 250 FOR IP): Performed by: FAMILY MEDICINE

## 2024-08-27 PROCEDURE — 2580000003 HC RX 258: Performed by: CLINICAL NURSE SPECIALIST

## 2024-08-27 PROCEDURE — 99231 SBSQ HOSP IP/OBS SF/LOW 25: CPT | Performed by: INTERNAL MEDICINE

## 2024-08-27 PROCEDURE — 2580000003 HC RX 258: Performed by: NURSE PRACTITIONER

## 2024-08-27 PROCEDURE — 6370000000 HC RX 637 (ALT 250 FOR IP): Performed by: CLINICAL NURSE SPECIALIST

## 2024-08-27 PROCEDURE — 6360000002 HC RX W HCPCS: Performed by: CLINICAL NURSE SPECIALIST

## 2024-08-27 PROCEDURE — 97165 OT EVAL LOW COMPLEX 30 MIN: CPT

## 2024-08-27 RX ORDER — PRAZOSIN HYDROCHLORIDE 1 MG/1
1 CAPSULE ORAL NIGHTLY
Status: DISCONTINUED | OUTPATIENT
Start: 2024-08-27 | End: 2024-08-27 | Stop reason: CLARIF

## 2024-08-27 RX ORDER — PRAZOSIN HYDROCHLORIDE 1 MG/1
2 CAPSULE ORAL NIGHTLY PRN
Status: DISCONTINUED | OUTPATIENT
Start: 2024-08-27 | End: 2024-08-29 | Stop reason: HOSPADM

## 2024-08-27 RX ORDER — PRAZOSIN HYDROCHLORIDE 1 MG/1
1 CAPSULE ORAL NIGHTLY PRN
Status: DISCONTINUED | OUTPATIENT
Start: 2024-08-27 | End: 2024-08-29 | Stop reason: HOSPADM

## 2024-08-27 RX ADMIN — Medication 125 MG: at 11:41

## 2024-08-27 RX ADMIN — Medication 125 MG: at 06:32

## 2024-08-27 RX ADMIN — ENOXAPARIN SODIUM 30 MG: 100 INJECTION SUBCUTANEOUS at 20:23

## 2024-08-27 RX ADMIN — Medication 125 MG: at 00:18

## 2024-08-27 RX ADMIN — Medication 125 MG: at 18:33

## 2024-08-27 RX ADMIN — LINEZOLID 600 MG: 600 TABLET, FILM COATED ORAL at 20:23

## 2024-08-27 RX ADMIN — LINEZOLID 600 MG: 600 TABLET, FILM COATED ORAL at 10:14

## 2024-08-27 RX ADMIN — ENOXAPARIN SODIUM 30 MG: 100 INJECTION SUBCUTANEOUS at 10:14

## 2024-08-27 RX ADMIN — TRAMADOL HYDROCHLORIDE 50 MG: 50 TABLET ORAL at 16:16

## 2024-08-27 RX ADMIN — MEROPENEM 1000 MG: 1 INJECTION INTRAVENOUS at 16:16

## 2024-08-27 RX ADMIN — Medication 1 CAPSULE: at 10:14

## 2024-08-27 RX ADMIN — PRAZOSIN HYDROCHLORIDE 1 MG: 1 CAPSULE ORAL at 22:52

## 2024-08-27 RX ADMIN — Medication 1 CAPSULE: at 17:13

## 2024-08-27 RX ADMIN — Medication 10 ML: at 10:15

## 2024-08-27 RX ADMIN — LEVOFLOXACIN 500 MG: 500 TABLET, FILM COATED ORAL at 10:14

## 2024-08-27 RX ADMIN — TRAMADOL HYDROCHLORIDE 50 MG: 50 TABLET ORAL at 22:52

## 2024-08-27 RX ADMIN — ONDANSETRON 4 MG: 4 TABLET, ORALLY DISINTEGRATING ORAL at 16:16

## 2024-08-27 ASSESSMENT — PAIN SCALES - GENERAL
PAINLEVEL_OUTOF10: 7
PAINLEVEL_OUTOF10: 5
PAINLEVEL_OUTOF10: 9

## 2024-08-27 ASSESSMENT — PAIN DESCRIPTION - LOCATION: LOCATION: COCCYX

## 2024-08-27 ASSESSMENT — PAIN DESCRIPTION - DESCRIPTORS: DESCRIPTORS: THROBBING

## 2024-08-27 ASSESSMENT — PAIN DESCRIPTION - ORIENTATION: ORIENTATION: MID

## 2024-08-27 ASSESSMENT — PAIN DESCRIPTION - PAIN TYPE: TYPE: ACUTE PAIN

## 2024-08-27 NOTE — PROGRESS NOTES
expansion. Auscultation reveals no wheezing, crackles, or rhonchi.   Cardiovascular: S1 and S2 are rhythmic and regular.   Abdomen: Positive bowel sounds to auscultation. Benign to palpation. Large   Extremities: No clubbing, no cyanosis, some edema.  PIV    Laboratory and Tests Review:  Lab Results   Component Value Date    WBC 10.0 08/25/2024    WBC 10.4 08/24/2024    WBC 16.3 (H) 08/22/2024    HGB 11.1 (L) 08/25/2024    HCT 37.3 08/25/2024    MCV 78.2 (L) 08/25/2024     08/25/2024     Lab Results   Component Value Date    NEUTROABS 6.49 08/25/2024    NEUTROABS 6.69 08/24/2024    NEUTROABS 11.81 (H) 08/22/2024     No results found for: \"CRP\"  No results found for: \"SEDRATE\"  Lab Results   Component Value Date    ALT 14 08/22/2024    AST 13 08/22/2024    ALKPHOS 143 (H) 08/22/2024    BILITOT 0.4 08/22/2024     Lab Results   Component Value Date/Time     08/25/2024 09:17 AM    K 3.6 08/25/2024 09:17 AM     08/25/2024 09:17 AM    CO2 28 08/25/2024 09:17 AM    BUN 10 08/25/2024 09:17 AM    CREATININE 0.8 08/25/2024 09:17 AM    GFRAA >60 07/18/2019 07:15 AM    LABGLOM >90 08/25/2024 09:17 AM    GLUCOSE 119 08/25/2024 09:17 AM    CALCIUM 8.3 08/25/2024 09:17 AM    BILITOT 0.4 08/22/2024 10:57 PM    ALKPHOS 143 08/22/2024 10:57 PM    AST 13 08/22/2024 10:57 PM    ALT 14 08/22/2024 10:57 PM     Radiology:  Reviewed     Microbiology:   C.diff- pending  Blood culture- no growth   8/22/2024- wound culture- evaluating for anaerobes.  Strep, GNR, staph species   8/22/2024- culture - bacteroides fragilis     ASSESSMENT:  Leukocytosis- improved   Pilonidal cyst  History of E.coli UTI  History of c.diff     PLAN:  Discussed with Dr. Hayden   Continue PO vancomycin- day #5  Stop levaquin and flagyl- change to Meropenem- listed allergy to PCN and cephalosporin    Patient could not tolerate flagyl   Continue zyvox   Check cultures- reviewed   Monitor labs- reviewed- WBC- 10.4 - repeat labs in am   Continue wound  care  Surgery following -   Micro called and asked to work-up cultures- will await final cultures   MRI with fluid collection in the gluteal cleft area   Discharge planning- to [facility]     GURINDER Awan - CNS  1:26 PM  8/27/2024

## 2024-08-27 NOTE — PLAN OF CARE
Problem: Pain  Goal: Verbalizes/displays adequate comfort level or baseline comfort level  8/27/2024 0144 by Marv Juarez RN  Outcome: Progressing  8/26/2024 1820 by Antonia Tinoco RN  Outcome: Progressing     Problem: Safety - Adult  Goal: Free from fall injury  8/27/2024 0144 by Marv Juarez RN  Outcome: Progressing  8/26/2024 1820 by Antonia Tinoco, RN  Outcome: Progressing

## 2024-08-27 NOTE — CARE COORDINATION
8/27/2024 1048 CM note: Pt resides with a friend and his family in [place].She recently moved to [facility] and plans to establish a new PCP at [facility]  She was admitted with infected wound from pilonidal cyst. Pt states there is no one in her home that can learn wound care and she requests SNF, prefers [facility]. [facility] accepted pt and will submit for PRECERT when therapy evals are available. For SNF, will need PRECERT, HENS and signed MALCOLM. CM will follow for wound care/abx needs. (IF insurance denies SNF, CM placed call to [facility] as possible backup plan). Stevo ARAGON

## 2024-08-27 NOTE — PROGRESS NOTES
Progress  Note  Chief Complaint   Patient presents with    Wound Check     States had cyst removed from bee in march, schedule for wound clinic at [facility] on Tuesday, , it's open and draining, no fever, some inter chills, vomiting started today,      Historical Issues:  Current Facility-Administered Medications   Medication Dose Route Frequency Provider Last Rate Last Admin    metroNIDAZOLE (FLAGYL) tablet 500 mg  500 mg Oral 3 times per day Jerry Herrera APRN - CNS   500 mg at 08/26/24 2119    levoFLOXacin (LEVAQUIN) tablet 500 mg  500 mg Oral Daily Jerry Herrera APRN - CNS   500 mg at 08/27/24 1014    linezolid (ZYVOX) tablet 600 mg  600 mg Oral 2 times per day Jerry Herrera APRN - CNS   600 mg at 08/27/24 1014    traMADol (ULTRAM) tablet 50 mg  50 mg Oral Q6H PRN Kelsey De La Cruz DO   50 mg at 08/25/24 0831    sodium chloride flush 0.9 % injection 5-40 mL  5-40 mL IntraVENous 2 times per day Leticia Kennedy APRN - CNP   10 mL at 08/27/24 1015    sodium chloride flush 0.9 % injection 5-40 mL  5-40 mL IntraVENous PRN Leticia Kennedy APRN - CNP        0.9 % sodium chloride infusion   IntraVENous PRN Leticia Kennedy APRN - CNP        potassium chloride (KLOR-CON M) extended release tablet 40 mEq  40 mEq Oral PRN Leticia Kennedy APRN - CNP        Or    potassium bicarb-citric acid (EFFER-K) effervescent tablet 40 mEq  40 mEq Oral PRN Leticia Kennedy APRN - CNP        Or    potassium chloride 10 mEq/100 mL IVPB (Peripheral Line)  10 mEq IntraVENous PRN Leticia Kennedy APRN - CNP        magnesium sulfate 2000 mg in 50 mL IVPB premix  2,000 mg IntraVENous PRN Leticia Kennedy APRN - CNP        enoxaparin Sodium (LOVENOX) injection 30 mg  30 mg SubCUTAneous BID Leticia Kennedy APRN - CNP   30 mg at 08/27/24 1014    ondansetron (ZOFRAN-ODT) disintegrating tablet 4 mg  4 mg Oral Q8H PRN Leticia Kennedy APRN - CNP   4 mg at 08/26/24 2200    Or    ondansetron  (ZOFRAN) injection 4 mg  4 mg IntraVENous Q6H PRN Leticia Kennedy APRN - CNP   4 mg at 08/25/24 0251    polyethylene glycol (GLYCOLAX) packet 17 g  17 g Oral Daily PRN Leticia Kennedy APRN - CNP        acetaminophen (TYLENOL) tablet 650 mg  650 mg Oral Q6H PRN Leticia Kennedy APRN - CNP   650 mg at 08/25/24 0250    Or    acetaminophen (TYLENOL) suppository 650 mg  650 mg Rectal Q6H PRN Leticia Kennedy APRN - CNP        vancomycin (VANCOCIN) 50 mg/mL oral solution 125 mg  125 mg Oral 4 times per day Cherie Torres MD   125 mg at 08/27/24 1141    lactobacillus (CULTURELLE) capsule 1 capsule  1 capsule Oral BID WC Shireen Quesada DO   1 capsule at 08/27/24 1014       Recent Complaints:  Review of Systems  Vitals:    08/27/24 0700   BP: (!) 88/53   Pulse: 85   Resp: 18   Temp: 97.6 °F (36.4 °C)   SpO2: 100%     Physical Exam  Cardiovascular:      Rate and Rhythm: Normal rate.   Pulmonary:      Effort: Pulmonary effort is normal.   Neurological:      Mental Status: She is alert.         Recent Labs     08/25/24  0916 08/25/24  0917   NA  --  138   K  --  3.6   CL  --  103   CO2  --  28   BUN  --  10   CREATININE 0.8 0.8   GLUCOSE  --  119*   CALCIUM  --  8.3*     Recent Labs     08/25/24  0917   WBC 10.0   RBC 4.77   HGB 11.1*   HCT 37.3   MCV 78.2*   MCH 23.3*   MCHC 29.8*   RDW 15.4*      MPV 9.4           Principal Problem:    Wound infection  Active Problems:    WD-Chronic recurrent pilonidal cyst    Infected open wound    Wound of sacral region    Other schizophrenia (HCC)  Resolved Problems:    * No resolved hospital problems. *      Plan:  Polymicrobial wound infection from pilonidal cyst.  Continue wound packing.  Working on discharge process.      Case Discussed with:      Electronically signed by Eliseo Mas MD on 8/27/2024 at 1:07 PM

## 2024-08-27 NOTE — PROGRESS NOTES
Physical Therapy Treatment Note/Plan of Care    Room #:  0423/0423-02  Patient Name: Jaymie Lau  YOB: 1995  MRN: [id number]    Date of Service: 8/27/2024     Tentative placement recommendation: Home, possible PT for ankle pain  Equipment recommendation: To be determined      Evaluating Physical Therapist: Tracee Auguste PT #[id number]      Specific Provider Orders/Date/Referring Provider :   08/26/24 1130    PT eval and treat  Start:  08/26/24 1130,   End:  08/26/24 1130,   ONE TIME,   Standing Count:  1 Occurrences,   R       Eliseo Mas MD    Admitting Diagnosis:   Wound infection [T14.8XXA, L08.9]  Infected open wound [T14.8XXA, L08.9]      Surgery: none      Patient Active Problem List   Diagnosis    WD-Chronic recurrent pilonidal cyst    WD-Nonhealing surgical wound    Wound infection    Infected open wound    Wound of sacral region    Other schizophrenia (HCC)        ASSESSMENT of Current Deficits Patient present increased strength, balance, and endurance , improved functional mobility, transfers, gait distance, and tolerance to activity. Pt ambulated independently, turns with no LOB. Patient performed 6 steps with up with good, down with bad due to ankle pain. She demonstrated method without instruction.Patient is independent with gait, stairs, transfers. She has met goals.         PHYSICAL THERAPY  PLAN OF CARE       Physical therapy plan of care is established based on physician order,  patient diagnosis and clinical assessment    Current Treatment Recommendations:    -Bed Mobility: Lower and upper extremity exercises, and trunk control activities  -Sitting Balance: Incorporate reaching activities to activate trunk muscles , Hands on support to maintain midline , and Facilitate active trunk muscle engagement   -Standing Balance: Perform strengthening exercises in standing to promote motor control with or without upper extremity support , Instruct patient on adequate base of support to  functional limits    Increase range of motion 10% of affected joints    Strength BUE:  refer to OT eval  RLE:  4-/5  LLE:  4-/5  Increase strength in affected mm groups by 1/3 grade   Balance Sitting EOB:  good -  Dynamic Standing:  fair + hand held   Sitting EOB:  good   Dynamic Standing: good      Patient is Alert & Oriented x person, place, time, and situation and follows directions    Sensation:  Patient  denies numbness/tingling   Edema:  no   Endurance: fair      Vitals: room air   Blood Pressure at rest  Blood Pressure during session    Heart Rate at rest  Heart Rate during session    SPO2 at rest %  SPO2 during session %     Patient education  Patient educated on role of Physical Therapy, risks of immobility, safety and plan of care,  importance of mobility while in hospital , ankle pumps, quad set and glut set for edema control, blood clot prevention, and safety      Patient response to education:   Pt verbalized understanding Pt demonstrated skill Pt requires further education in this area   Yes Partial Yes      Treatment:  Patient practiced and was instructed/facilitated in the following treatment: Patient walking in hallway. We returned to room briefly, patient sat in chair. Again Walked with patient, had her turn with no LOB. Patient performed stairs. Walked in hallway, returned to room. Patient did not want to RTB to elevate ankle, sat in chair, with foot elevated, I applied ice pack.      Therapeutic Exercises:  not performed, ankle sore from walking.     At end of session, patient in chair with  call light and phone within reach,  all lines and tubes intact, nursing notified.      Patient would benefit from continued skilled Physical Therapy to improve functional independence and quality of life.         Patient's/ family goals   rehab    Time in  1537  Time out  1547    Total Treatment Time  10 minutes  .    CPT codes:    Therapeutic exercises (37155)   10 minutes  1 unit(s)    Eleanor Adams PTA

## 2024-08-27 NOTE — PROGRESS NOTES
OCCUPATIONAL THERAPY INITIAL EVALUATION    [facility]  [address] [place] [place]        Date:2024                                                  Patient Name: Jaymie Lau    MRN: [id number]    : 1995    Room: [address] [place] [address]      Evaluating OT: Serge Swenson OTR/L; #[id number]     Referring Provider and Specific Provider Orders/Date:      24 1130  OT eval and treat  Start:  24 113,   End:  24 1130,   ONE TIME,   Standing Count:  1 Occurrences,   R         Eliseo Mas MD      Placement Recommendation: Subacute Rehab       Diagnosis:   1. Wound infection         Surgery: None      Pertinent Medical History:       Past Medical History:   Diagnosis Date    ADHD     Anxiety     Bipolar 1 disorder (HCC)     Chiari I malformation (HCC)     Depression     PTSD (post-traumatic stress disorder)     Schizo affective schizophrenia (HCC)          Past Surgical History:   Procedure Laterality Date    BREAST REDUCTION SURGERY      OTHER SURGICAL HISTORY      chirari malformation decompression    WISDOM TOOTH EXTRACTION          Precautions:  Up with assistance, falls , Pilonidal cyst, Bipolar      Assessment of current deficits:     [x] Functional mobility  [x]ADLs  [x] Strength               []Cognition    [x] Functional transfers   [x] IADLs         [] Safety Awareness   [x]Endurance    [] Fine Coordination              [x] Balance      [] Vision/perception   []Sensation     []Gross Motor Coordination  [] ROM  [] Delirium                   [] Motor Control     OT PLAN OF CARE   OT POC based on physician orders, patient diagnosis and results of clinical assessment    Frequency/Duration 1-3 days/wk for 2 weeks PRN     Specific OT Treatment Interventions to include:   * Instruction/training on adapted ADL techniques and AE recommendations to increase functional independence within precautions       * Training on energy conservation strategies,  functional independence and quality of life.    Treatment: OT treatment provided this date includes:   Instruction/training on safety and adapted techniques for completion of ADLs   Instruction/training on safe functional mobility/transfer techniques   Instruction/training on energy conservation/work simplification for completion of ADLs   Instruction/training on proper positioning/alignment to prevent contractures    Neuromuscular Reeducation to facilitate balance/righting reactions for increased function with ADLs tasks      Rehab Potential: Good for established goals.      Patient / Family Goal: Patient would like to return home and return to [facility] re: ADLs and IADLs        Patient and/or family were instructed on functional diagnosis, prognosis/goals and OT plan of care. Demonstrated good understanding.     Eval Complexity: Low  History: Brief review of medical records and additional review of physical, cognitive, or psychosocial history related to current functional performance  Exam: 3+ performance deficits  Assistance/Modification: Mod assistance or modifications required to perform tasks. May have comorbidities that affect occupational performance.    Time In: 10:18 AM  Time Out: 10:35 AM    Total Treatment Time: 0      Min Units   OT Eval Low 97165  X  1    OT Eval Medium 08796      OT Eval High 77498      OT Re-Eval 13462            ADL/Self Care 75991     Therapeutic Activities 64239       Therapeutic Ex 59175       Orthotic Management 62601       Manual 46833     Neuro Re-Ed 69283       Non-Billable Time        Evaluation Time additionally includes thorough review of current medical information, gathering information on past medical history/social history and prior level of function, interpretation of standardized testing/informal observation of tasks, assessment of data and development of plan of care and goals.        Evaluating OT: Serge Swenson OTR/L; #[id number]

## 2024-08-28 ENCOUNTER — APPOINTMENT (OUTPATIENT)
Dept: INTERVENTIONAL RADIOLOGY/VASCULAR | Age: 29
End: 2024-08-28
Payer: COMMERCIAL

## 2024-08-28 LAB
ALBUMIN SERPL-MCNC: 3.6 G/DL (ref 3.5–5.2)
ALP SERPL-CCNC: 128 U/L (ref 35–104)
ALT SERPL-CCNC: 24 U/L (ref 0–32)
ANION GAP SERPL CALCULATED.3IONS-SCNC: 15 MMOL/L (ref 7–16)
ANION GAP SERPL CALCULATED.3IONS-SCNC: 8 MMOL/L (ref 7–16)
AST SERPL-CCNC: 24 U/L (ref 0–31)
BASOPHILS # BLD: 0.05 K/UL (ref 0–0.2)
BASOPHILS NFR BLD: 1 % (ref 0–2)
BILIRUB SERPL-MCNC: 0.3 MG/DL (ref 0–1.2)
BUN SERPL-MCNC: 10 MG/DL (ref 6–20)
BUN SERPL-MCNC: 14 MG/DL (ref 6–20)
CALCIUM SERPL-MCNC: 8.4 MG/DL (ref 8.6–10.2)
CALCIUM SERPL-MCNC: 8.6 MG/DL (ref 8.6–10.2)
CHLORIDE SERPL-SCNC: 102 MMOL/L (ref 98–107)
CHLORIDE SERPL-SCNC: 97 MMOL/L (ref 98–107)
CO2 SERPL-SCNC: 22 MMOL/L (ref 22–29)
CO2 SERPL-SCNC: 26 MMOL/L (ref 22–29)
CREAT SERPL-MCNC: 0.8 MG/DL (ref 0.5–1)
CREAT SERPL-MCNC: 0.9 MG/DL (ref 0.5–1)
EOSINOPHIL # BLD: 0.19 K/UL (ref 0.05–0.5)
EOSINOPHILS RELATIVE PERCENT: 2 % (ref 0–6)
ERYTHROCYTE [DISTWIDTH] IN BLOOD BY AUTOMATED COUNT: 15.8 % (ref 11.5–15)
GFR, ESTIMATED: >90 ML/MIN/1.73M2
GFR, ESTIMATED: >90 ML/MIN/1.73M2
GLUCOSE SERPL-MCNC: 143 MG/DL (ref 74–99)
GLUCOSE SERPL-MCNC: 93 MG/DL (ref 74–99)
HCT VFR BLD AUTO: 37.3 % (ref 34–48)
HGB BLD-MCNC: 11.5 G/DL (ref 11.5–15.5)
IMM GRANULOCYTES # BLD AUTO: 0.03 K/UL (ref 0–0.58)
IMM GRANULOCYTES NFR BLD: 0 % (ref 0–5)
LYMPHOCYTES NFR BLD: 2.18 K/UL (ref 1.5–4)
LYMPHOCYTES RELATIVE PERCENT: 22 % (ref 20–42)
MCH RBC QN AUTO: 23.6 PG (ref 26–35)
MCHC RBC AUTO-ENTMCNC: 30.8 G/DL (ref 32–34.5)
MCV RBC AUTO: 76.6 FL (ref 80–99.9)
MICROORGANISM SPEC CULT: ABNORMAL
MICROORGANISM SPEC CULT: NORMAL
MICROORGANISM SPEC CULT: NORMAL
MICROORGANISM/AGENT SPEC: ABNORMAL
MONOCYTES NFR BLD: 0.76 K/UL (ref 0.1–0.95)
MONOCYTES NFR BLD: 8 % (ref 2–12)
NEUTROPHILS NFR BLD: 68 % (ref 43–80)
NEUTS SEG NFR BLD: 6.88 K/UL (ref 1.8–7.3)
PLATELET # BLD AUTO: 315 K/UL (ref 130–450)
PMV BLD AUTO: 9.7 FL (ref 7–12)
POTASSIUM SERPL-SCNC: 3.3 MMOL/L (ref 3.5–5)
POTASSIUM SERPL-SCNC: 3.9 MMOL/L (ref 3.5–5)
PROT SERPL-MCNC: 6.3 G/DL (ref 6.4–8.3)
RBC # BLD AUTO: 4.87 M/UL (ref 3.5–5.5)
SERVICE CMNT-IMP: NORMAL
SERVICE CMNT-IMP: NORMAL
SODIUM SERPL-SCNC: 134 MMOL/L (ref 132–146)
SODIUM SERPL-SCNC: 136 MMOL/L (ref 132–146)
SPECIMEN DESCRIPTION: ABNORMAL
SPECIMEN DESCRIPTION: NORMAL
SPECIMEN DESCRIPTION: NORMAL
WBC OTHER # BLD: 10.1 K/UL (ref 4.5–11.5)

## 2024-08-28 PROCEDURE — 6370000000 HC RX 637 (ALT 250 FOR IP): Performed by: FAMILY MEDICINE

## 2024-08-28 PROCEDURE — 05H533Z INSERTION OF INFUSION DEVICE INTO RIGHT SUBCLAVIAN VEIN, PERCUTANEOUS APPROACH: ICD-10-PCS | Performed by: RADIOLOGY

## 2024-08-28 PROCEDURE — 2580000003 HC RX 258: Performed by: NURSE PRACTITIONER

## 2024-08-28 PROCEDURE — 36415 COLL VENOUS BLD VENIPUNCTURE: CPT

## 2024-08-28 PROCEDURE — 36410 VNPNXR 3YR/> PHY/QHP DX/THER: CPT

## 2024-08-28 PROCEDURE — 6370000000 HC RX 637 (ALT 250 FOR IP): Performed by: CLINICAL NURSE SPECIALIST

## 2024-08-28 PROCEDURE — 6370000000 HC RX 637 (ALT 250 FOR IP): Performed by: SPECIALIST

## 2024-08-28 PROCEDURE — 1200000000 HC SEMI PRIVATE

## 2024-08-28 PROCEDURE — 85025 COMPLETE CBC W/AUTO DIFF WBC: CPT

## 2024-08-28 PROCEDURE — 6360000002 HC RX W HCPCS: Performed by: CLINICAL NURSE SPECIALIST

## 2024-08-28 PROCEDURE — 6360000002 HC RX W HCPCS: Performed by: NURSE PRACTITIONER

## 2024-08-28 PROCEDURE — 6370000000 HC RX 637 (ALT 250 FOR IP): Performed by: INTERNAL MEDICINE

## 2024-08-28 PROCEDURE — 6360000002 HC RX W HCPCS: Performed by: INTERNAL MEDICINE

## 2024-08-28 PROCEDURE — 2500000003 HC RX 250 WO HCPCS: Performed by: INTERNAL MEDICINE

## 2024-08-28 PROCEDURE — 2580000003 HC RX 258: Performed by: INTERNAL MEDICINE

## 2024-08-28 PROCEDURE — 80053 COMPREHEN METABOLIC PANEL: CPT

## 2024-08-28 PROCEDURE — B5161ZA FLUOROSCOPY OF RIGHT SUBCLAVIAN VEIN USING LOW OSMOLAR CONTRAST, GUIDANCE: ICD-10-PCS | Performed by: RADIOLOGY

## 2024-08-28 PROCEDURE — 76000 FLUOROSCOPY <1 HR PHYS/QHP: CPT

## 2024-08-28 PROCEDURE — 6370000000 HC RX 637 (ALT 250 FOR IP): Performed by: NURSE PRACTITIONER

## 2024-08-28 PROCEDURE — 76937 US GUIDE VASCULAR ACCESS: CPT

## 2024-08-28 PROCEDURE — 80048 BASIC METABOLIC PNL TOTAL CA: CPT

## 2024-08-28 PROCEDURE — P9047 ALBUMIN (HUMAN), 25%, 50ML: HCPCS | Performed by: INTERNAL MEDICINE

## 2024-08-28 PROCEDURE — 99233 SBSQ HOSP IP/OBS HIGH 50: CPT | Performed by: INTERNAL MEDICINE

## 2024-08-28 PROCEDURE — 2580000003 HC RX 258: Performed by: CLINICAL NURSE SPECIALIST

## 2024-08-28 RX ORDER — LINEZOLID 600 MG/1
600 TABLET, FILM COATED ORAL EVERY 12 HOURS SCHEDULED
DISCHARGE
Start: 2024-08-28 | End: 2024-09-02

## 2024-08-28 RX ORDER — SODIUM CHLORIDE 0.9 % (FLUSH) 0.9 %
5-40 SYRINGE (ML) INJECTION EVERY 12 HOURS SCHEDULED
Status: DISCONTINUED | OUTPATIENT
Start: 2024-08-28 | End: 2024-08-29 | Stop reason: HOSPADM

## 2024-08-28 RX ORDER — ALBUMIN (HUMAN) 12.5 G/50ML
25 SOLUTION INTRAVENOUS ONCE
Status: COMPLETED | OUTPATIENT
Start: 2024-08-28 | End: 2024-08-28

## 2024-08-28 RX ORDER — POTASSIUM CHLORIDE 1500 MG/1
20 TABLET, EXTENDED RELEASE ORAL DAILY
Status: DISCONTINUED | OUTPATIENT
Start: 2024-08-28 | End: 2024-08-29 | Stop reason: HOSPADM

## 2024-08-28 RX ORDER — SODIUM CHLORIDE 9 MG/ML
INJECTION, SOLUTION INTRAVENOUS PRN
Status: DISCONTINUED | OUTPATIENT
Start: 2024-08-28 | End: 2024-08-29 | Stop reason: HOSPADM

## 2024-08-28 RX ORDER — 0.9 % SODIUM CHLORIDE 0.9 %
1000 INTRAVENOUS SOLUTION INTRAVENOUS ONCE
Status: COMPLETED | OUTPATIENT
Start: 2024-08-28 | End: 2024-08-28

## 2024-08-28 RX ORDER — LIDOCAINE HYDROCHLORIDE 10 MG/ML
50 INJECTION, SOLUTION INFILTRATION; PERINEURAL ONCE
Status: DISCONTINUED | OUTPATIENT
Start: 2024-08-28 | End: 2024-08-29 | Stop reason: HOSPADM

## 2024-08-28 RX ORDER — SODIUM CHLORIDE 0.9 % (FLUSH) 0.9 %
5-40 SYRINGE (ML) INJECTION PRN
Status: DISCONTINUED | OUTPATIENT
Start: 2024-08-28 | End: 2024-08-29 | Stop reason: HOSPADM

## 2024-08-28 RX ADMIN — MEROPENEM 1000 MG: 1 INJECTION INTRAVENOUS at 05:52

## 2024-08-28 RX ADMIN — Medication 10 ML: at 22:21

## 2024-08-28 RX ADMIN — TRAMADOL HYDROCHLORIDE 50 MG: 50 TABLET ORAL at 18:09

## 2024-08-28 RX ADMIN — SODIUM CHLORIDE, PRESERVATIVE FREE 10 ML: 5 INJECTION INTRAVENOUS at 22:13

## 2024-08-28 RX ADMIN — ANTI-FUNGAL POWDER MICONAZOLE NITRATE TALC FREE: 1.42 POWDER TOPICAL at 22:12

## 2024-08-28 RX ADMIN — Medication 1 CAPSULE: at 18:09

## 2024-08-28 RX ADMIN — PRAZOSIN HYDROCHLORIDE 1 MG: 1 CAPSULE ORAL at 23:42

## 2024-08-28 RX ADMIN — Medication 125 MG: at 06:31

## 2024-08-28 RX ADMIN — POTASSIUM CHLORIDE 20 MEQ: 1500 TABLET, EXTENDED RELEASE ORAL at 18:15

## 2024-08-28 RX ADMIN — Medication 125 MG: at 00:07

## 2024-08-28 RX ADMIN — SODIUM CHLORIDE, PRESERVATIVE FREE 10 ML: 5 INJECTION INTRAVENOUS at 10:40

## 2024-08-28 RX ADMIN — POTASSIUM CHLORIDE 40 MEQ: 1500 TABLET, EXTENDED RELEASE ORAL at 10:28

## 2024-08-28 RX ADMIN — ANTI-FUNGAL POWDER MICONAZOLE NITRATE TALC FREE: 1.42 POWDER TOPICAL at 10:29

## 2024-08-28 RX ADMIN — Medication 125 MG: at 23:43

## 2024-08-28 RX ADMIN — Medication 125 MG: at 19:04

## 2024-08-28 RX ADMIN — ALBUMIN (HUMAN) 25 G: 0.25 INJECTION, SOLUTION INTRAVENOUS at 11:48

## 2024-08-28 RX ADMIN — TRAMADOL HYDROCHLORIDE 50 MG: 50 TABLET ORAL at 23:43

## 2024-08-28 RX ADMIN — ENOXAPARIN SODIUM 30 MG: 100 INJECTION SUBCUTANEOUS at 22:13

## 2024-08-28 RX ADMIN — LINEZOLID 600 MG: 600 TABLET, FILM COATED ORAL at 10:28

## 2024-08-28 RX ADMIN — LINEZOLID 600 MG: 600 TABLET, FILM COATED ORAL at 22:13

## 2024-08-28 RX ADMIN — ONDANSETRON 4 MG: 4 TABLET, ORALLY DISINTEGRATING ORAL at 07:59

## 2024-08-28 RX ADMIN — Medication 1 CAPSULE: at 10:28

## 2024-08-28 RX ADMIN — SODIUM CHLORIDE 1000 ML: 9 INJECTION, SOLUTION INTRAVENOUS at 10:39

## 2024-08-28 RX ADMIN — ENOXAPARIN SODIUM 30 MG: 100 INJECTION SUBCUTANEOUS at 10:27

## 2024-08-28 RX ADMIN — MEROPENEM 1000 MG: 1 INJECTION INTRAVENOUS at 17:25

## 2024-08-28 ASSESSMENT — PAIN SCALES - GENERAL
PAINLEVEL_OUTOF10: 7
PAINLEVEL_OUTOF10: 5

## 2024-08-28 ASSESSMENT — PAIN DESCRIPTION - DESCRIPTORS: DESCRIPTORS: ACHING

## 2024-08-28 ASSESSMENT — PAIN DESCRIPTION - LOCATION: LOCATION: ARM

## 2024-08-28 ASSESSMENT — PAIN DESCRIPTION - ORIENTATION: ORIENTATION: RIGHT

## 2024-08-28 NOTE — PROGRESS NOTES
Call placed to ID office to inform that midline is unable to be placed and patient will have to go to IR.  Left message with  staff.

## 2024-08-28 NOTE — PROGRESS NOTES
Progress  Note  Chief Complaint   Patient presents with    Wound Check     [facility], , it's open and draining, no fever, some inter chills, vomiting started today,      Historical Issues:  Current Facility-Administered Medications   Medication Dose Route Frequency Provider Last Rate Last Admin    miconazole (MICOTIN) 2 % powder   Topical BID Eliseo Mas MD   Given at 08/28/24 1029    sodium chloride flush 0.9 % injection 5-40 mL  5-40 mL IntraVENous 2 times per day Jerry Herrera APRN - CNS   10 mL at 08/28/24 1040    sodium chloride flush 0.9 % injection 5-40 mL  5-40 mL IntraVENous PRN Jerry Herrera APRN - CNS        0.9 % sodium chloride infusion   IntraVENous PRN Jerry Herrera APRN - CNS        lidocaine 1 % injection 50 mg  50 mg IntraDERmal Once Jerry Herrera APRN - CNS        potassium chloride (KLOR-CON M) extended release tablet 20 mEq  20 mEq Oral Daily Eliseo Mas MD        meropenem (MERREM) 1,000 mg in sodium chloride 0.9 % 100 mL IVPB ([id number])  1,000 mg IntraVENous Q8H Jerry Herrera APRN - CNS   Stopped at 08/28/24 0852    Lumateperone Tosylate CAPS 21 mg  (Patient Supplied)  21 mg Oral Nightly Eliseo Mas MD   21 mg at 08/27/24 2024    prazosin (MINIPRESS) capsule 1 mg  1 mg Oral Nightly PRN Eliseo Mas MD   1 mg at 08/27/24 2252    Or    prazosin (MINIPRESS) capsule 2 mg  2 mg Oral Nightly PRN Eliseo Mas MD        linezolid (ZYVOX) tablet 600 mg  600 mg Oral 2 times per day Jerry Herrera APRN - CNS   600 mg at 08/28/24 1028    traMADol (ULTRAM) tablet 50 mg  50 mg Oral Q6H PRN Kelsey De La Cruz DO   50 mg at 08/27/24 2252    sodium chloride flush 0.9 % injection 5-40 mL  5-40 mL IntraVENous 2 times per day Leticia Kennedy, APRN - CNP   10 mL at 08/27/24 1015    sodium chloride flush 0.9 % injection 5-40 mL  5-40 mL IntraVENous PRN

## 2024-08-28 NOTE — DISCHARGE INSTR - COC
Continuity of Care Form    Patient Name: Jaymie Lau   :  1995  MRN:  [id number]    Admit date:  2024  Discharge date:  24    Code Status Order: Full Code   Advance Directives:   Advance Care Flowsheet Documentation             Admitting Physician:  Shireen Quesada DO  PCP: No primary care provider on file.    Discharging Nurse: Juan Pablo  Discharging Hospital Unit/Room#: 0423/0423-02  Discharging Unit Phone Number: [phone]    Emergency Contact:   Extended Emergency Contact Information  Primary Emergency Contact: Chloe Copeland  Home Phone: [phone]  Relation: Parent  Preferred language: English   needed? No  Secondary Emergency Contact: [email or web address]  "[employer]" Phone: [phone]  Relation: Other  Preferred language: English   needed? No    Past Surgical History:  Past Surgical History:   Procedure Laterality Date    BREAST REDUCTION SURGERY      OTHER SURGICAL HISTORY      chirari malformation decompression    WISDOM TOOTH EXTRACTION         Immunization History:   Immunization History   Administered Date(s) Administered    COVID-19, PFIZER PURPLE top, DILUTE for use, (age 12 y+), 30mcg/0.3mL 10/24/2021, 2021       Active Problems:  Patient Active Problem List   Diagnosis Code    WD-Chronic recurrent pilonidal cyst L05.91    WD-Nonhealing surgical wound T81.89XA    Wound infection T14.8XXA, L08.9    Infected open wound T14.8XXA, L08.9    Wound of sacral region S31.000A    Other schizophrenia (HCC) F20.89       Isolation/Infection:   Isolation            No Isolation          Patient Infection Status       None to display                     Nurse Assessment:  Last Vital Signs: /81   Pulse (!) 118   Temp 97.4 °F (36.3 °C) (Oral)   Resp 18   Ht 1.651 m (5' 5\")   Wt 124.3 kg (274 lb)   SpO2 98%   BMI 45.60 kg/m²     Last documented pain score (0-10 scale): Pain Level: 5  Last Weight:   Wt Readings from Last 1 Encounters:   24 124.3 kg (274 lb)

## 2024-08-28 NOTE — PROGRESS NOTES
Midline attempted unable to thread guide wire, after 2 attempts in basilic and brachial vein, pt tearful states hurts, procedure aborted and RN aware need to go to IR

## 2024-08-28 NOTE — CARE COORDINATION
8/28/2024 1148 CM note: Pt was admitted with infected wound from pilonidal cyst. She states there is no one in her home that can learn wound care and she requests SNF, prefers [facility]. [facility] accepted pt and obtained PRECERT. HENS done,will need signed MALCOLM. Will need final iv abx orders and midline placed prior to d/c. Stevo ARAGON

## 2024-08-28 NOTE — PROGRESS NOTES
[facility]  [facility]  Progress Note    CC: leukocytosis, rule out c.diff  Face to face encounter   SUBJECTIVE:  8/28/2024  Has been afebrile. In bed- nausea better. With loose stools. On room air.     8/27/2024  Patient is in bed- has been afebrile. On room air. Patient reports she was so nauseated and had an emesis after flagyl. Cannot tolerate.     8/26/2024  Has been afebrile. Laying on side. No new issues overnight.   She reports she is tolerating antibiotics.     8/25/2024  Patient in bed- laying on left side. Has been afebrile. Patient reports drainage is less     8/24/2024  Patient is in bed- on side. Reports she feels drainage from pilonidal cyst   Diarrhea is improving- has been afebrile. On room air.   Patient is tolerating medications. No reported adverse drug reactions.  No nausea, vomiting, diarrhea.    Medications:  Scheduled Meds:   miconazole   Topical BID    sodium chloride flush  5-40 mL IntraVENous 2 times per day    lidocaine 1 % injection  50 mg IntraDERmal Once    meropenem  1,000 mg IntraVENous Q8H    Lumateperone Tosylate  21 mg Oral Nightly    linezolid  600 mg Oral 2 times per day    sodium chloride flush  5-40 mL IntraVENous 2 times per day    enoxaparin  30 mg SubCUTAneous BID    vancomycin  125 mg Oral 4 times per day    lactobacillus  1 capsule Oral BID WC     Continuous Infusions:   sodium chloride      sodium chloride       PRN Meds:sodium chloride flush, sodium chloride, prazosin **OR** prazosin, traMADol, sodium chloride flush, sodium chloride, potassium chloride **OR** potassium alternative oral replacement **OR** potassium chloride, magnesium sulfate, ondansetron **OR** ondansetron, polyethylene glycol, acetaminophen **OR** acetaminophen  OBJECTIVE:  Patient Vitals for the past 24 hrs:   BP Temp Temp src Pulse Resp SpO2   08/28/24 1015 -- -- -- -- -- 98 %   08/28/24 0800 120/81 -- -- (!) 118 -- --   08/28/24 0655 (!) 83/44 97.4 °F (36.3 °C) Oral 94

## 2024-08-29 VITALS
SYSTOLIC BLOOD PRESSURE: 95 MMHG | TEMPERATURE: 98.7 F | HEIGHT: 65 IN | HEART RATE: 82 BPM | RESPIRATION RATE: 18 BRPM | BODY MASS INDEX: 45.65 KG/M2 | OXYGEN SATURATION: 96 % | WEIGHT: 274 LBS | DIASTOLIC BLOOD PRESSURE: 59 MMHG

## 2024-08-29 PROCEDURE — 6370000000 HC RX 637 (ALT 250 FOR IP): Performed by: CLINICAL NURSE SPECIALIST

## 2024-08-29 PROCEDURE — 6360000002 HC RX W HCPCS: Performed by: CLINICAL NURSE SPECIALIST

## 2024-08-29 PROCEDURE — 99239 HOSP IP/OBS DSCHRG MGMT >30: CPT | Performed by: INTERNAL MEDICINE

## 2024-08-29 PROCEDURE — 83993 ASSAY FOR CALPROTECTIN FECAL: CPT

## 2024-08-29 PROCEDURE — 6370000000 HC RX 637 (ALT 250 FOR IP): Performed by: FAMILY MEDICINE

## 2024-08-29 PROCEDURE — 2580000003 HC RX 258: Performed by: CLINICAL NURSE SPECIALIST

## 2024-08-29 PROCEDURE — 6370000000 HC RX 637 (ALT 250 FOR IP): Performed by: INTERNAL MEDICINE

## 2024-08-29 PROCEDURE — 6360000002 HC RX W HCPCS: Performed by: NURSE PRACTITIONER

## 2024-08-29 PROCEDURE — 6370000000 HC RX 637 (ALT 250 FOR IP): Performed by: SPECIALIST

## 2024-08-29 RX ORDER — LACTOBACILLUS RHAMNOSUS GG 10B CELL
1 CAPSULE ORAL 2 TIMES DAILY WITH MEALS
DISCHARGE
Start: 2024-08-29

## 2024-08-29 RX ORDER — ONDANSETRON 4 MG/1
4 TABLET, ORALLY DISINTEGRATING ORAL EVERY 8 HOURS PRN
DISCHARGE
Start: 2024-08-29

## 2024-08-29 RX ORDER — TRAMADOL HYDROCHLORIDE 50 MG/1
50 TABLET ORAL EVERY 6 HOURS PRN
Qty: 12 TABLET | Refills: 0 | Status: SHIPPED | DISCHARGE
Start: 2024-08-29 | End: 2024-09-01

## 2024-08-29 RX ORDER — POTASSIUM CHLORIDE 1500 MG/1
20 TABLET, EXTENDED RELEASE ORAL DAILY
DISCHARGE
Start: 2024-08-29

## 2024-08-29 RX ORDER — ENOXAPARIN SODIUM 100 MG/ML
30 INJECTION SUBCUTANEOUS 2 TIMES DAILY
DISCHARGE
Start: 2024-08-29

## 2024-08-29 RX ADMIN — SODIUM CHLORIDE, PRESERVATIVE FREE 10 ML: 5 INJECTION INTRAVENOUS at 08:56

## 2024-08-29 RX ADMIN — POTASSIUM CHLORIDE 20 MEQ: 1500 TABLET, EXTENDED RELEASE ORAL at 08:54

## 2024-08-29 RX ADMIN — MEROPENEM 1000 MG: 1 INJECTION INTRAVENOUS at 06:13

## 2024-08-29 RX ADMIN — LINEZOLID 600 MG: 600 TABLET, FILM COATED ORAL at 08:54

## 2024-08-29 RX ADMIN — Medication 125 MG: at 11:29

## 2024-08-29 RX ADMIN — ANTI-FUNGAL POWDER MICONAZOLE NITRATE TALC FREE: 1.42 POWDER TOPICAL at 08:54

## 2024-08-29 RX ADMIN — Medication 1 CAPSULE: at 08:54

## 2024-08-29 RX ADMIN — ENOXAPARIN SODIUM 30 MG: 100 INJECTION SUBCUTANEOUS at 08:54

## 2024-08-29 RX ADMIN — Medication 125 MG: at 06:08

## 2024-08-29 ASSESSMENT — PAIN SCALES - GENERAL
PAINLEVEL_OUTOF10: 0
PAINLEVEL_OUTOF10: 0

## 2024-08-29 NOTE — CARE COORDINATION
8/29/2024 1006 CM note: Pt was admitted with infected wound from pilonidal cyst. [facility] accepted pt and obtained PRECERT. HENS done,will need signed MALCOLM. Midline in place. Arrangements made for pt to be transported to facility at 1:30pm via PAS w/c transport.(SNF will cover transportation cost if insurance doesn't cover per liaison Cher). Patient,[employer] and SNF liaison informed of arrangements. N-N [phone]. [employer] informed of pt's car in parking lot-vm message left. Stevo ARAGON

## 2024-08-29 NOTE — FLOWSHEET NOTE
Inpatient Wound Care    Admit Date: 8/22/2024  8:07 PM    Reason for consult:  pilonal cyst    Significant history:      Wound history:     08/29/24 1244   Wound 08/23/24 Coccyx Patient had a cyst removed and now has open wound that is draining and malodorous as stated by the patient.   Date First Assessed: 08/23/24   Present on Original Admission: Yes  Location: Coccyx  Wound Description (Comments): Patient had a cyst removed and now has open wound that is draining and malodorous as stated by the patient.   Wound Image    Wound Cleansed Cleansed with saline   Wound Length (cm) 2.6 cm   Wound Width (cm) 2.5 cm   Wound Depth (cm) 2.5 cm   Wound Surface Area (cm^2) 6.5 cm^2   Wound Volume (cm^3) 16.25 cm^3   Wound Assessment Pink/red   Drainage Amount Scant (moist but unmeasurable)   Drainage Description Serosanguinous   Odor None       **Informed Consent**    The patient has given verbal consent to have photos taken of wound and inserted into their chart as part of their permanent medical record for purposes of documentation, treatment management and/or medical review.   All Images taken on 8/29/24 of patient name: Jaymie Lau were transmitted and stored on secured Epic  Site located within Media Folder Tab by a registered Epic-[employer] Mobile Application Device.       Impression:  chronic wound from a pilonal cyst    Interventions in place:  moist packing    Plan:  Cont packing daily and prn  May consider wound care center follow up      Georgina Shannon RN 8/29/2024 12:45 PM

## 2024-08-29 NOTE — PLAN OF CARE
Problem: Pain  Goal: Verbalizes/displays adequate comfort level or baseline comfort level  Outcome: Progressing     Problem: Safety - Adult  Goal: Free from fall injury  Outcome: Progressing     Problem: Skin/Tissue Integrity - Adult  Goal: Skin will be intact without erythema or breakdown  Outcome: Progressing

## 2024-08-31 LAB
CALPROTECTIN, FECAL: 11 UG/G
SEND OUT REPORT: NORMAL
TEST NAME: NORMAL

## 2024-09-01 LAB
SEND OUT REPORT: NORMAL
TEST NAME: NORMAL

## 2024-09-04 ENCOUNTER — OUTSIDE SERVICES (OUTPATIENT)
Dept: PRIMARY CARE CLINIC | Age: 29
End: 2024-09-04

## 2024-09-04 DIAGNOSIS — A41.9 SEPSIS, DUE TO UNSPECIFIED ORGANISM, UNSPECIFIED WHETHER ACUTE ORGAN DYSFUNCTION PRESENT (HCC): ICD-10-CM

## 2024-09-04 DIAGNOSIS — Q07.00 ARNOLD-CHIARI SYNDROME WITHOUT SPINA BIFIDA OR HYDROCEPHALUS (HCC): ICD-10-CM

## 2024-09-04 DIAGNOSIS — L05.91 CHRONIC RECURRENT PILONIDAL CYST: Primary | ICD-10-CM

## 2024-09-04 DIAGNOSIS — F25.0 SCHIZOAFFECTIVE DISORDER, BIPOLAR TYPE (HCC): ICD-10-CM

## 2024-09-05 ASSESSMENT — ENCOUNTER SYMPTOMS
SINUS PRESSURE: 0
COUGH: 0
EYE ITCHING: 0
SORE THROAT: 0
WHEEZING: 0
TROUBLE SWALLOWING: 0
GASTROINTESTINAL NEGATIVE: 1
SHORTNESS OF BREATH: 0
RHINORRHEA: 0
EYE DISCHARGE: 0
SINUS PAIN: 0
EYE REDNESS: 0
VOICE CHANGE: 0

## 2024-09-05 ASSESSMENT — VISUAL ACUITY: OU: 1

## 2024-09-05 NOTE — PROGRESS NOTES
depressed.         Behavior: Behavior normal. Behavior is not agitated.         Cognition and Memory: Cognition and memory normal.         Assessment & Plan:  1. WD-Chronic recurrent pilonidal cyst  2. Sepsis, due to unspecified organism, unspecified whether acute organ dysfunction present (HCC)  3. Arnold-Chiari syndrome without spina bifida or hydrocephalus (HCC)  4. Schizoaffective disorder, bipolar type (HCC)     Hospital notes reviewed   Chart and medications reviewed   Pilonidal cyst on meropenum and will follow with ID   Schizophrenia continue medications   Check labs   Continue current treatment     IDina MA  am scribing for Dr. Alexander     I, Lucy Spencer MD, personally performed the services described in this documentation as scribed by Dina Foster and it is both accurate and complete

## 2024-09-09 ENCOUNTER — OUTSIDE SERVICES (OUTPATIENT)
Dept: PRIMARY CARE CLINIC | Age: 29
End: 2024-09-09

## 2024-09-09 DIAGNOSIS — Q07.00 ARNOLD-CHIARI SYNDROME WITHOUT SPINA BIFIDA OR HYDROCEPHALUS (HCC): ICD-10-CM

## 2024-09-09 DIAGNOSIS — Z86.718 HX OF DEEP VENOUS THROMBOSIS: Primary | ICD-10-CM

## 2024-09-09 DIAGNOSIS — A41.9 SEPSIS, DUE TO UNSPECIFIED ORGANISM, UNSPECIFIED WHETHER ACUTE ORGAN DYSFUNCTION PRESENT (HCC): ICD-10-CM

## 2024-09-09 DIAGNOSIS — F25.0 SCHIZOAFFECTIVE DISORDER, BIPOLAR TYPE (HCC): ICD-10-CM

## 2024-09-09 DIAGNOSIS — L05.91 CHRONIC RECURRENT PILONIDAL CYST: ICD-10-CM

## 2025-07-10 NOTE — ED NOTES
Patient arrives from home with concerns of her wound on her coccyx. Patient had a pilonidal abscess drained on march 2024. Patient is wanting the wound looked at she is concerned for infection. Patient reports she is currently taking an antibiotic for cdiff infection. Patient reports pain 7/10. Patient is alert and oriented and walks with steady gait.    Benefits, risks, and possible complications of procedure explained to patient/caregiver who verbalized understanding and gave written consent.